# Patient Record
Sex: FEMALE | Race: WHITE | NOT HISPANIC OR LATINO | Employment: FULL TIME | URBAN - METROPOLITAN AREA
[De-identification: names, ages, dates, MRNs, and addresses within clinical notes are randomized per-mention and may not be internally consistent; named-entity substitution may affect disease eponyms.]

---

## 2017-06-01 ENCOUNTER — TRANSFERRED RECORDS (OUTPATIENT)
Dept: HEALTH INFORMATION MANAGEMENT | Facility: CLINIC | Age: 27
End: 2017-06-01

## 2017-06-01 LAB — PAP SMEAR - HIM PATIENT REPORTED: NEGATIVE

## 2019-01-14 ENCOUNTER — OFFICE VISIT (OUTPATIENT)
Dept: INTERNAL MEDICINE | Facility: CLINIC | Age: 29
End: 2019-01-14
Payer: COMMERCIAL

## 2019-01-14 VITALS
OXYGEN SATURATION: 98 % | HEART RATE: 91 BPM | SYSTOLIC BLOOD PRESSURE: 112 MMHG | HEIGHT: 63 IN | RESPIRATION RATE: 18 BRPM | WEIGHT: 200.8 LBS | BODY MASS INDEX: 35.58 KG/M2 | DIASTOLIC BLOOD PRESSURE: 72 MMHG | TEMPERATURE: 98.4 F

## 2019-01-14 DIAGNOSIS — Z83.49 FAMILY HISTORY OF THYROID DISEASE: ICD-10-CM

## 2019-01-14 DIAGNOSIS — R30.0 DYSURIA: Primary | ICD-10-CM

## 2019-01-14 DIAGNOSIS — F33.41 RECURRENT MAJOR DEPRESSIVE DISORDER, IN PARTIAL REMISSION (H): ICD-10-CM

## 2019-01-14 DIAGNOSIS — Z71.6 ENCOUNTER FOR SMOKING CESSATION COUNSELING: ICD-10-CM

## 2019-01-14 LAB
ALBUMIN UR-MCNC: NEGATIVE MG/DL
APPEARANCE UR: CLEAR
BILIRUB UR QL STRIP: NEGATIVE
COLOR UR AUTO: YELLOW
GLUCOSE UR STRIP-MCNC: NEGATIVE MG/DL
HGB UR QL STRIP: NEGATIVE
KETONES UR STRIP-MCNC: NEGATIVE MG/DL
LEUKOCYTE ESTERASE UR QL STRIP: NEGATIVE
NITRATE UR QL: NEGATIVE
PH UR STRIP: 7.5 PH (ref 5–7)
SOURCE: ABNORMAL
SP GR UR STRIP: 1.01 (ref 1–1.03)
UROBILINOGEN UR STRIP-ACNC: 0.2 EU/DL (ref 0.2–1)

## 2019-01-14 PROCEDURE — 81003 URINALYSIS AUTO W/O SCOPE: CPT | Performed by: INTERNAL MEDICINE

## 2019-01-14 PROCEDURE — 99214 OFFICE O/P EST MOD 30 MIN: CPT | Performed by: INTERNAL MEDICINE

## 2019-01-14 PROCEDURE — 84443 ASSAY THYROID STIM HORMONE: CPT | Performed by: INTERNAL MEDICINE

## 2019-01-14 PROCEDURE — 36415 COLL VENOUS BLD VENIPUNCTURE: CPT | Performed by: INTERNAL MEDICINE

## 2019-01-14 RX ORDER — CITALOPRAM HYDROBROMIDE 20 MG/1
40 TABLET ORAL DAILY
Start: 2019-01-14

## 2019-01-14 RX ORDER — CITALOPRAM HYDROBROMIDE 20 MG/1
TABLET ORAL
COMMUNITY
Start: 2015-04-01 | End: 2019-01-14

## 2019-01-14 ASSESSMENT — MIFFLIN-ST. JEOR: SCORE: 1618.91

## 2019-01-14 NOTE — NURSING NOTE
"Vital signs:  Temp: 98.4  F (36.9  C) Temp src: Oral BP: 112/72 Pulse: 91   Resp: 18 SpO2: 98 %     Height: 160.7 cm (5' 3.25\") Weight: 91.1 kg (200 lb 12.8 oz)  Estimated body mass index is 35.29 kg/m  as calculated from the following:    Height as of this encounter: 1.607 m (5' 3.25\").    Weight as of this encounter: 91.1 kg (200 lb 12.8 oz).          "

## 2019-01-14 NOTE — PROGRESS NOTES
ASSESSMENT/PLAN:       1. Dysuria  UA without signs of infection, encourage more hydration.   - UA reflex to Microscopic and Culture    2. Family history of thyroid disease  Thyroid levels checked, normal  - TSH with free T4 reflex    3. Recurrent major depressive disorder, in partial remission (H)  Refilled today as is working well for patient  - citalopram (CELEXA) 20 MG tablet; Take 2 tablets (40 mg) by mouth daily    4. Encounter for smoking cessation counseling  Did encourage patient to start nicotine patch, instructed her on use  - nicotine (NICODERM CQ) 7 MG/24HR 24 hr patch; Place 1 patch onto the skin every 24 hours  Dispense: 60 patch; Refill: 3    Miri Carrillo MD  [facility]        SUBJECTIVE:   Danisha Blood is a 28 year old female who presents to clinic today for the following health issues:    She would like to have her thyroid level check today. She also has urine odor for the past couple of months on and off.    Dysuria off/on, felt similar to her previous UTI. No fevers.    She recently found her father has Hashimoto's thyroiditis. Feeling hot/cold fluctuations. She occasionally has softer stools/diarrhea. No weight gain recently.     TSH at 1.18 last 2/2017    Current smoker:  1 pack/week. Feeling as if they want to quit.      Depression/Anxiety  Seeing a therapist for over 10 years  She is also seeing pyschiatrist, on celexa 40mg. Mood symptoms well controlled    Problem list and histories reviewed & adjusted, as indicated.  Additional history: as documented    There is no problem list on file for this patient.    History reviewed. No pertinent surgical history.    Social History     Tobacco Use     Smoking status: Current Every Day Smoker     Smokeless tobacco: Never Used   Substance Use Topics     Alcohol use: Yes     Comment: Once a day     Family History   Problem Relation Age of Onset     Diabetes Father            Reviewed and updated as needed this visit by clinical  "staff  Tobacco  Allergies  Meds  Med Hx  Surg Hx  Fam Hx  Soc Hx      Reviewed and updated as needed this visit by Provider         ROS:  CONSTITUTIONAL: NEGATIVE for fever, chills, change in weight  INTEGUMENTARY/SKIN: NEGATIVE for worrisome rashes, moles or lesions  EYES: NEGATIVE for vision changes or irritation  ENT/MOUTH: NEGATIVE for ear, mouth and throat problems  RESP: NEGATIVE for significant cough or SOB  BREAST: NEGATIVE for masses, tenderness or discharge  CV: NEGATIVE for chest pain, palpitations or peripheral edema  GI: +heartburn, diarrhea  : NEGATIVE for frequency, dysuria, or hematuria  MUSCULOSKELETAL: +intermittent left-sided neck pain  NEURO: NEGATIVE for weakness, dizziness or paresthesias  ENDOCRINE: NEGATIVE for temperature intolerance, skin/hair changes  HEME: NEGATIVE for bleeding problems  PSYCHIATRIC: NEGATIVE for changes in mood or affect    OBJECTIVE:     /72 (BP Location: Right arm, Patient Position: Sitting, Cuff Size: Adult Regular)   Pulse 91   Temp 98.4  F (36.9  C) (Oral)   Resp 18   Ht 1.607 m (5' 3.25\")   Wt 91.1 kg (200 lb 12.8 oz)   SpO2 98%   BMI 35.29 kg/m    Body mass index is 35.29 kg/m .  GENERAL: healthy, alert and no distress  NECK: no adenopathy, no asymmetry, masses, or scars and thyroid normal to palpation  RESP: lungs clear to auscultation - no rales, rhonchi or wheezes  CV: regular rate and rhythm, normal S1 S2, no S3 or S4, no murmur, click or rub, no peripheral edema and peripheral pulses strong  ABDOMEN: soft, nontender, no hepatosplenomegaly, no masses and bowel sounds normal  MS: no gross musculoskeletal defects noted, no edema    Diagnostic Test Results:  none     "

## 2019-01-15 LAB — TSH SERPL DL<=0.005 MIU/L-ACNC: 4 MU/L (ref 0.4–4)

## 2019-02-15 ENCOUNTER — HEALTH MAINTENANCE LETTER (OUTPATIENT)
Age: 29
End: 2019-02-15

## 2019-07-02 ENCOUNTER — HOSPITAL ENCOUNTER (EMERGENCY)
Facility: CLINIC | Age: 29
Discharge: HOME | End: 2019-07-02
Attending: EMERGENCY MEDICINE | Admitting: EMERGENCY MEDICINE
Payer: COMMERCIAL

## 2019-07-02 VITALS
RESPIRATION RATE: 16 BRPM | OXYGEN SATURATION: 97 % | DIASTOLIC BLOOD PRESSURE: 81 MMHG | SYSTOLIC BLOOD PRESSURE: 130 MMHG | TEMPERATURE: 99 F | HEART RATE: 97 BPM
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
## 2019-07-02 PROCEDURE — 12015 RPR F/E/E/N/L/M 7.6-12.5 CM: CPT

## 2019-07-02 PROCEDURE — 99283 EMERGENCY DEPT VISIT LOW MDM: CPT | Mod: 25

## 2019-07-02 PROCEDURE — 90471 IMMUNIZATION ADMIN: CPT

## 2019-07-02 PROCEDURE — 90715 TDAP VACCINE 7 YRS/> IM: CPT | Performed by: EMERGENCY MEDICINE

## 2019-07-02 PROCEDURE — 25000128 H RX IP 250 OP 636: Performed by: EMERGENCY MEDICINE

## 2019-07-02 RX ADMIN — CLOSTRIDIUM TETANI TOXOID ANTIGEN (FORMALDEHYDE INACTIVATED), CORYNEBACTERIUM DIPHTHERIAE TOXOID ANTIGEN (FORMALDEHYDE INACTIVATED), BORDETELLA PERTUSSIS TOXOID ANTIGEN (GLUTARALDEHYDE INACTIVATED), BORDETELLA PERTUSSIS FILAMENTOUS HEMAGGLUTININ ANTIGEN (FORMALDEHYDE INACTIVATED), BORDETELLA PERTUSSIS PERTACTIN ANTIGEN, AND BORDETELLA PERTUSSIS FIMBRIAE 2/3 ANTIGEN 0.5 ML: 5; 2; 2.5; 5; 3; 5 INJECTION, SUSPENSION INTRAMUSCULAR at 20:32

## 2019-07-02 ASSESSMENT — ENCOUNTER SYMPTOMS
EYE PAIN: 0
WOUND: 1

## 2019-07-02 NOTE — ED AVS SNAPSHOT
Emergency Department  [address] [place] [address]  Phone:  [phone]  Fax:  [phone]                                    Danisha Blood   MRN: [id number]    Department:   Emergency Department   Date of Visit:  7/2/2019           After Visit Summary Signature Page    I have received my discharge instructions, and my questions have been answered. I have discussed any challenges I see with this plan with the nurse or doctor.    ..........................................................................................................................................  Patient/Patient Representative Signature      ..........................................................................................................................................  Patient Representative Print Name and Relationship to Patient    ..................................................               ................................................  Date                                   Time    ..........................................................................................................................................  Reviewed by Signature/Title    ...................................................              ..............................................  Date                                               Time          22EPIC Rev 08/18

## 2019-07-03 NOTE — ED PROVIDER NOTES
History     Chief Complaint:  Dog Bite     HPI   Danisha Blood is a 28 year old female who presents for evaluation of a dog bite. Today around 1840, the patient suffered an unprovoked bite from her mother's dog sustaining lacerations above and below her right eye and near her right ear. The dog's shots are up to date and the dog has been acting normally. Due to these injuries, the patient came into the ED for evaluation. She has not had any eye pain or visual disturbance.     Allergies:  NKDA      Medications:    citalopram (CELEXA) 20 MG tablet  levonorgestrel (KYLEENA) 19.5 MG IUD  nicotine (NICODERM CQ) 7 MG/24HR 24 hr patch    Past Medical History:    Depression     Past Surgical History:    History reviewed. No pertinent past surgical history.     Family History:    Diabetes - Father     Social History:  Tobacco use:    Current every day smoker   Alcohol use:    Positive   Marital status:    Single   Accompanied to ED by:  Family      Review of Systems   Eyes: Negative for pain and visual disturbance.   Skin: Positive for wound (dog bite, above and below right eye, near right ear).   All other systems reviewed and are negative.      Physical Exam   First Vitals:  BP: (!) 140/96  Pulse: 97  Heart Rate: 90  Temp: 99  F (37.2  C)  Resp: 16  SpO2: 97 %    Physical Exam  Vitals: reviewed by me  General: Pt seen on \[facility]", [facility], cooperative, and alert to conversation  Eyes: Tracking well, clear conjunctiva BL, does have a 3 cm crescent-shaped laceration above her eyelid, slightly inferior to the eyebrow, superficial, violates to dermis only.  Also has a 5 cm crescent-shaped laceration underneath her inferior eyelid, does not violate eyelid or nasolacrimal apparatus.  This wound is deep to subcutaneous fat.  ENT: MMM, midline trachea.   A third laceration is present anterior to the right ear, superficial to subcutaneous fat only, and thorough exploration under anesthesia I see no evidence of  parotid gland involvement.  Lungs:   No tachypnea, no accessory muscle use. No respiratory distress.   CV: Rate as above, regular rhythm.    MSK: no peripheral edema or joint effusion.  No evidence of trauma  Skin: No rash, normal turgor and temperature  Neuro: Clear speech and no facial droop.  All branches of facial nerve are intact on the right side.  Psych: Not RIS, no e/o AH/VH        Emergency Department Course     Procedures:     Laceration Repair        LACERATION:  A simple minimally Contaminated 8 cm laceration-broken up in 3 separate places as above.      LOCATION: Preauricular area, supraorbital, infraorbital.      FUNCTION:  Distally sensation, circulation and motor are intact.      ANESTHESIA:  Local using 8ccs total of lido w epi       PREPARATION:  Irrigation and Scrubbing with Shur Clens      DEBRIDEMENT:  no debridement      CLOSURE:  Wound was closed with One Layer.  Skin closed with 8 x 5.0 Ethylon using interrupted sutures in a very loose fashion, allowing room for drainage.    Interventions:  2032 Tdap 0.5 mL IM     Emergency Department Course:  Nursing notes and vitals reviewed.  1928: I performed an exam of the patient as documented above.     2011:  A laceration repair was performed as outlined in the procedure note above.  The patient tolerated well and there were no complications.     Findings and plan explained to the Patient. Patient discharged home with instructions regarding supportive care, medications, and reasons to return. The importance of close follow-up was reviewed. The patient was prescribed Augmentin.      Impression & Plan      Medical Decision Making:  Danisha Blood is a 28 year old female who presents to the emergency room with a dog bite. This was a non-provoked bite but the dog is up to date on its shots. The patient states that she feels comfortable going home, can avoid the dog very easily. Mother is owner of the dog. The patient has wounds to her supra and  infraorbital space as well as just anterior to her tragus but has thankfully no neurologic issues. Her facial nerve appears to be completely intact. I see no evidence of parotid gland injury, and lids and lashes as well as her nasolacrimal apparatus appear to be completely uninvolved. She is not entrapped and has a normal exam under anesthesia, and these appear to be relatively superficial cuts although one did break through to the subcutaneous fat. The patient was copiously irrigated as above and will be given Augmentin to go home with. She knows to come back to the ER with any fevers or redness and I note that given the cosmetic nature of the wound I did elect to do a loose closure instead of leaving these open to heal, which was discussed with the patient. Will discharge with very clear return to ED precautions and primary care follow up.     Diagnosis:    ICD-10-CM   1. Dog bite, initial encounter W54.0XXA       Disposition:  Discharged to home with Augmentin.     Discharge Medications:  amoxicillin-clavulanate 875-125 MG tablet  Commonly known as:  AUGMENTIN  1 tablet, Oral, 2 TIMES DAILY            ILinden, am serving as a scribe at 7:28 PM on 7/2/2019 to document services personally performed by Dr. Luna, based on my observations and the provider's statements to me.     EMERGENCY DEPARTMENT       Laci Luna MD  07/02/19 [id number]

## 2019-11-21 ENCOUNTER — MYC REFILL (OUTPATIENT)
Dept: INTERNAL MEDICINE | Facility: CLINIC | Age: 29
End: 2019-11-21

## 2019-11-21 DIAGNOSIS — F33.41 RECURRENT MAJOR DEPRESSIVE DISORDER, IN PARTIAL REMISSION (H): ICD-10-CM

## 2019-11-21 NOTE — TELEPHONE ENCOUNTER
"Requested Prescriptions   Pending Prescriptions Disp Refills     citalopram (CELEXA) 20 MG tablet       Sig: Take 2 tablets (40 mg) by mouth daily       SSRIs Protocol Failed - 11/21/2019  1:05 AM        Failed - PHQ-9 score less than 5 in past 6 months     Please review last PHQ-9 score.           Passed - Medication is active on med list        Passed - Patient is age 18 or older        Passed - No active pregnancy on record        Passed - No positive pregnancy test in last 12 months        Passed - Recent (6 mo) or future (30 days) visit within the authorizing provider's specialty     Patient had office visit in the last 6 months or has a visit in the next 30 days with authorizing provider or within the authorizing provider's specialty.  See \"Patient Info\" tab in inbasket, or \"Choose Columns\" in Meds & Orders section of the refill encounter.              "

## 2019-11-21 NOTE — TELEPHONE ENCOUNTER
"Per 1/14/19 office visit note:  \"Depression/Anxiety  Seeing a therapist for over 10 years  She is also seeing pyschiatrist, on celexa 40mg. Mood symptoms well controlled\"    Medication reported historical. Pharmacy states this has been prescribed by a provider outside of this clinic. [facility] message sent asking patient if this is still prescribed by another provider.    "

## 2019-12-09 ENCOUNTER — MYC MEDICAL ADVICE (OUTPATIENT)
Dept: INTERNAL MEDICINE | Facility: CLINIC | Age: 29
End: 2019-12-09

## 2019-12-09 ENCOUNTER — OFFICE VISIT (OUTPATIENT)
Dept: INTERNAL MEDICINE | Facility: CLINIC | Age: 29
End: 2019-12-09
Payer: COMMERCIAL

## 2019-12-09 VITALS
HEART RATE: 69 BPM | TEMPERATURE: 98.4 F | SYSTOLIC BLOOD PRESSURE: 110 MMHG | RESPIRATION RATE: 16 BRPM | WEIGHT: 199 LBS | BODY MASS INDEX: 35.26 KG/M2 | OXYGEN SATURATION: 99 % | DIASTOLIC BLOOD PRESSURE: 68 MMHG | HEIGHT: 63 IN

## 2019-12-09 DIAGNOSIS — Z02.9 ADMINISTRATIVE ENCOUNTER: ICD-10-CM

## 2019-12-09 DIAGNOSIS — Z00.00 ROUTINE GENERAL MEDICAL EXAMINATION AT A HEALTH CARE FACILITY: Primary | ICD-10-CM

## 2019-12-09 DIAGNOSIS — R53.83 OTHER FATIGUE: ICD-10-CM

## 2019-12-09 DIAGNOSIS — F33.41 RECURRENT MAJOR DEPRESSIVE DISORDER, IN PARTIAL REMISSION (H): ICD-10-CM

## 2019-12-09 DIAGNOSIS — R68.89 COLD INTOLERANCE: ICD-10-CM

## 2019-12-09 PROCEDURE — 99395 PREV VISIT EST AGE 18-39: CPT | Performed by: INTERNAL MEDICINE

## 2019-12-09 RX ORDER — CITALOPRAM HYDROBROMIDE 20 MG/1
40 TABLET ORAL DAILY
OUTPATIENT
Start: 2019-12-09

## 2019-12-09 ASSESSMENT — ENCOUNTER SYMPTOMS
NAUSEA: 0
HEARTBURN: 0
FREQUENCY: 0
HEMATURIA: 0
COUGH: 0
ABDOMINAL PAIN: 0
BREAST MASS: 0
PALPITATIONS: 0
EYE PAIN: 0
DIARRHEA: 0
HEADACHES: 1
HEMATOCHEZIA: 0
FEVER: 0
DIZZINESS: 0
SHORTNESS OF BREATH: 0
DYSURIA: 0
PARESTHESIAS: 0
JOINT SWELLING: 0
ARTHRALGIAS: 0
CONSTIPATION: 0
NERVOUS/ANXIOUS: 0
SORE THROAT: 0
MYALGIAS: 0
CHILLS: 0
WEAKNESS: 0

## 2019-12-09 ASSESSMENT — PATIENT HEALTH QUESTIONNAIRE - PHQ9: SUM OF ALL RESPONSES TO PHQ QUESTIONS 1-9: 4

## 2019-12-09 ASSESSMENT — MIFFLIN-ST. JEOR: SCORE: 1605.75

## 2019-12-09 NOTE — PATIENT INSTRUCTIONS
Plan:  1. Please make a lab appointment for fasting labs  ( 12 hours fasting)   2. Form for school  3. Call/write back with the date of the last physical and pap  4. If the thyroid results are back abnormal we will consider meds as we discussed

## 2019-12-09 NOTE — PROGRESS NOTES
Dr Seay's note    Patient's instructions / PLAN:                                                        Plan:  1. Please make a lab appointment for fasting labs  ( 12 hours fasting)   2. Form for school  3. Call/write back with the date of the last physical and pap  4. If the thyroid results are back abnormal we will consider meds as we discussed     ASSESSMENT & PLAN:                                                        (Z00.00) Routine general medical examination at a health care facility  (primary encounter diagnosis)  Comment:   Plan: CBC with platelets, Comprehensive metabolic         panel, Lipid panel reflex to direct LDL         Fasting, TSH with free T4 reflex, Thyroid         peroxidase antibody          (F33.41) Recurrent major depressive disorder, in partial remission (H)  Comment: Controlled    Plan: f/u pychiatrist     (Z02.9) Administrative encounter  Comment:   Plan:     (R68.89) Cold intolerance  Comment: Not controlled   Plan: TSH with free T4 reflex, Thyroid peroxidase         antibody          (R53.83) Other fatigue  Comment: Not controlled, father has Hashimoto's   Plan: CBC with platelets, Comprehensive metabolic         panel, Lipid panel reflex to direct LDL         Fasting, TSH with free T4 reflex, Thyroid         peroxidase antibody    Chief Complaint:                                                      Annual exam  Forms for school     SUBJECTIVE:                                                    History of present illness     Ave Blood is a 29 year old female who presents to the clinic  today for a preventative visit and to establish care    We reviewed the chronic medical problems as above.   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    Preventative  -- Saw OB/GYN last April or the April before at [facility] in [place]  -- She had her last Pap then, but is unsure of the  exact date.  -- Flu vaccine on Oct 7  -- She has not had a blood test recently, and did not do them at OB/GYN  --   Patient reports:  -- pap smear done on June 2017 with OBGYN. It was normal . Info sent to [facility]       Thyroid  -- Her father has Hashimoto's, and she says she has been having some symptoms  -- She says her body runs warm her whole life, but recently she will feel cold one minute, and then very hot then next  -- She has been sleeping a lot more and feeling very tired  -- She did a sleep study a couple of years ago, she was told that she did not have sleep apnea and she did pursue further tests due to the cost.  -- TSH in January was 4.0, borderline high  -- Interested in medication if her next TSH levels are high    Depression/Anxitey  -- She sees psychiatrist   -- on Citalopram     ROS:   See Below    This document serves as a record of the services and decisions personally performed and made by Dr. Dawood MD. It was created on their behalf by Hasmukh Mccullough, a trained medical scribe. The creation of this document is based on the provider's statements to the medical scribe.  Hasmukh Mccullough December 9, 2019 9:47 AM    Past Medical History:   Diagnosis Date     Depression       History reviewed. No pertinent surgical history.     Soc Hx: No daily alcohol, no smoking  Social History     Socioeconomic History     Marital status: Single     Spouse name: Not on file     Number of children: Not on file     Years of education: Not on file     Highest education level: Not on file   Occupational History     Not on file   Social Needs     Financial resource strain: Not on file     Food insecurity:     Worry: Not on file     Inability: Not on file     Transportation needs:     Medical: Not on file     Non-medical: Not on file   Tobacco Use     Smoking status: Current Every Day Smoker     Smokeless tobacco: Never Used   Substance and Sexual Activity     Alcohol use: Yes     Comment: Once a day     Drug use: No  "    Sexual activity: Yes     Partners: Male   Lifestyle     Physical activity:     Days per week: Not on file     Minutes per session: Not on file     Stress: Not on file   Relationships     Social connections:     Talks on phone: Not on file     Gets together: Not on file     Attends Gnosticism service: Not on file     Active member of club or organization: Not on file     Attends meetings of clubs or organizations: Not on file     Relationship status: Not on file     Intimate partner violence:     Fear of current or ex partner: Not on file     Emotionally abused: Not on file     Physically abused: Not on file     Forced sexual activity: Not on file   Other Topics Concern     Not on file   Social History Narrative     Not on file        Fam Hx: reviewed  Family History   Problem Relation Age of Onset     Diabetes Father          Screening: reviewed      All: reviewed    Meds: reviewed  Current Outpatient Medications   Medication Sig Dispense Refill     citalopram (CELEXA) 20 MG tablet Take 2 tablets (40 mg) by mouth daily       levonorgestrel (KYLEENA) 19.5 MG IUD 1 each by Intrauterine route once         OBJECTIVE:                                                    Physical Exam :  Blood pressure 110/68, pulse 69, temperature 98.4  F (36.9  C), temperature source Oral, resp. rate 16, height 1.607 m (5' 3.25\"), weight 90.3 kg (199 lb), SpO2 99 %.   NAD, appears comfortable  Skin clear, no rashes  HEENT: PERRLA, EOMI, anicteric sclera, pink conjunctiva, external ears appear normal, bilateral tympanic membranes clinically normal, oropharynx normal color.  Neck: supple, no JVD,  no thyroidmegaly  Lymph nodes non palpable in the cervical, supraclavicular axillaries, inguinal areas  Chest: clear to auscultation with good respiratory effort  Cardiac: S1S2, RRR, no mgr appreciated  Abdomen: soft, not tender, not distended, audible bowel sound, no hepatosplenomegaly, no palpable masses, no abdominal bruits  Extremities: no " cyanosis, clubbing or edema.   Neuro: A, Ox3, no focal signs.  Breast exam in supine and erect position: they are symmetrical, no skin changes, no tenderness or nodes on palpation. Nipples are erect, no skin lesions, no discharge on pressure.    Pelvic exam: deferred, no symptoms, no hx of abnormal pap     The information in this document, created by the medical scribe for me, accurately reflects the services I personally performed and the decisions made by me. I have reviewed and approved this document for accuracy prior to leaving the patient care area.  December 9, 2019 10:23 AM     Awilda Seay MD  Internal Medicine     SUBJECTIVE:   CC: Danisha Blood is an 29 year old woman who presents for preventive health visit.     Healthy Habits:     Getting at least 3 servings of Calcium per day:  NO    Bi-annual eye exam:  Yes    Dental care twice a year:  Yes    Sleep apnea or symptoms of sleep apnea:  Daytime drowsiness    Diet:  Carbohydrate counting    Frequency of exercise:  None    Taking medications regularly:  Yes    Medication side effects:  None    PHQ-2 Total Score: 0    Additional concerns today:  Yes    Pap: last pap was normal about 1.5 years ago.      Today's PHQ-2 Score:   PHQ-2 ( 1999 Pfizer) 12/9/2019   Q1: Little interest or pleasure in doing things 0   Q2: Feeling down, depressed or hopeless 0   PHQ-2 Score 0   Q1: Little interest or pleasure in doing things Not at all   Q2: Feeling down, depressed or hopeless Not at all   PHQ-2 Score 0       Abuse: Current or Past(Physical, Sexual or Emotional)- No  Do you feel safe in your environment? Yes        Social History     Tobacco Use     Smoking status: Current Every Day Smoker     Smokeless tobacco: Never Used   Substance Use Topics     Alcohol use: Yes     Comment: Once a day     If you drink alcohol do you typically have >3 drinks per day or >7 drinks per week? Yes  About 7 drinks a week    Alcohol Use 12/9/2019   Prescreen: >3 drinks/day or >7  drinks/week? Yes   AUDIT SCORE  7     AUDIT - Alcohol Use Disorders Identification Test - Reproduced from the World Health Organization Audit 2001 (Second Edition) 12/9/2019   1.  How often do you have a drink containing alcohol? 2 to 3 times a week   2.  How many drinks containing alcohol do you have on a typical day when you are drinking? 3 or 4   3.  How often do you have five or more drinks on one occasion? Monthly   4.  How often during the last year have you found that you were not able to stop drinking once you had started? Never   5.  How often during the last year have you failed to do what was normally expected of you because of drinking? Less than monthly   6.  How often during the last year have you needed a first drink in the morning to get yourself going after a heavy drinking session? Never   7.  How often during the last year have you had a feeling of guilt or remorse after drinking? Never   8.  How often during the last year have you been unable to remember what happened the night before because of your drinking? Never   9.  Have you or someone else been injured because of your drinking? No   10. Has a relative, friend, doctor or other health care worker been concerned about your drinking or suggested you cut down? No   TOTAL SCORE 7       Reviewed orders with patient.  Reviewed health maintenance and updated orders accordingly - Yes  Labs reviewed in EPIC    Pertinent mammograms are reviewed under the imaging tab.  History of abnormal Pap smear: NO - age 21-29 PAP every 3 years recommended     Reviewed and updated as needed this visit by clinical staff         Reviewed and updated as needed this visit by Provider        Review of Systems   Constitutional: Negative for chills and fever.   HENT: Negative for congestion, ear pain, hearing loss and sore throat.    Eyes: Negative for pain and visual disturbance.   Respiratory: Negative for cough and shortness of breath.    Cardiovascular: Negative for  "chest pain, palpitations and peripheral edema.   Gastrointestinal: Negative for abdominal pain, constipation, diarrhea, heartburn, hematochezia and nausea.   Breasts:  Negative for tenderness, breast mass and discharge.   Genitourinary: Negative for dysuria, frequency, genital sores, hematuria, pelvic pain, urgency, vaginal bleeding and vaginal discharge.   Musculoskeletal: Negative for arthralgias, joint swelling and myalgias.   Skin: Negative for rash.   Neurological: Positive for headaches. Negative for dizziness, weakness and paresthesias.   Psychiatric/Behavioral: Negative for mood changes. The patient is not nervous/anxious.        COUNSELING:  Reviewed preventive health counseling, as reflected in patient instructions       Regular exercise       Healthy diet/nutrition    Estimated body mass index is 35.29 kg/m  as calculated from the following:    Height as of 1/14/19: 1.607 m (5' 3.25\").    Weight as of 1/14/19: 91.1 kg (200 lb 12.8 oz).    Weight management plan: Discussed healthy diet and exercise guidelines     reports that she has been smoking. She has never used smokeless tobacco.      Counseling Resources:  ATP IV Guidelines  Pooled Cohorts Equation Calculator  Breast Cancer Risk Calculator  FRAX Risk Assessment  ICSI Preventive Guidelines  Dietary Guidelines for Americans, 2010  USDA's MyPlate  ASA Prophylaxis  Lung CA Screening    Awilda Higgins MD  [facility]  "

## 2019-12-09 NOTE — Clinical Note
Patient reports:-- pap smear done on June 2017 with OBGYN. It was normal . Info sent to [facility]

## 2019-12-13 DIAGNOSIS — R68.89 COLD INTOLERANCE: ICD-10-CM

## 2019-12-13 DIAGNOSIS — R53.83 OTHER FATIGUE: ICD-10-CM

## 2019-12-13 DIAGNOSIS — Z00.00 ROUTINE GENERAL MEDICAL EXAMINATION AT A HEALTH CARE FACILITY: ICD-10-CM

## 2019-12-13 LAB
ALBUMIN SERPL-MCNC: 4.2 G/DL (ref 3.4–5)
ALP SERPL-CCNC: 72 U/L (ref 40–150)
ALT SERPL W P-5'-P-CCNC: 55 U/L (ref 0–50)
ANION GAP SERPL CALCULATED.3IONS-SCNC: 9 MMOL/L (ref 3–14)
AST SERPL W P-5'-P-CCNC: 24 U/L (ref 0–45)
BILIRUB SERPL-MCNC: 2.4 MG/DL (ref 0.2–1.3)
BUN SERPL-MCNC: 11 MG/DL (ref 7–30)
CALCIUM SERPL-MCNC: 9.3 MG/DL (ref 8.5–10.1)
CHLORIDE SERPL-SCNC: 108 MMOL/L (ref 94–109)
CHOLEST SERPL-MCNC: 127 MG/DL
CO2 SERPL-SCNC: 23 MMOL/L (ref 20–32)
CREAT SERPL-MCNC: 0.69 MG/DL (ref 0.52–1.04)
ERYTHROCYTE [DISTWIDTH] IN BLOOD BY AUTOMATED COUNT: 13.3 % (ref 10–15)
GFR SERPL CREATININE-BSD FRML MDRD: >90 ML/MIN/{1.73_M2}
GLUCOSE SERPL-MCNC: 80 MG/DL (ref 70–99)
HCT VFR BLD AUTO: 35.4 % (ref 35–47)
HDLC SERPL-MCNC: 37 MG/DL
HGB BLD-MCNC: 12.2 G/DL (ref 11.7–15.7)
LDLC SERPL CALC-MCNC: 66 MG/DL
MCH RBC QN AUTO: 31 PG (ref 26.5–33)
MCHC RBC AUTO-ENTMCNC: 34.5 G/DL (ref 31.5–36.5)
MCV RBC AUTO: 90 FL (ref 78–100)
NONHDLC SERPL-MCNC: 90 MG/DL
PLATELET # BLD AUTO: 258 10E9/L (ref 150–450)
POTASSIUM SERPL-SCNC: 3.4 MMOL/L (ref 3.4–5.3)
PROT SERPL-MCNC: 7.3 G/DL (ref 6.8–8.8)
RBC # BLD AUTO: 3.93 10E12/L (ref 3.8–5.2)
SODIUM SERPL-SCNC: 140 MMOL/L (ref 133–144)
TRIGL SERPL-MCNC: 122 MG/DL
TSH SERPL DL<=0.005 MIU/L-ACNC: 1.76 MU/L (ref 0.4–4)
WBC # BLD AUTO: 8.5 10E9/L (ref 4–11)

## 2019-12-13 PROCEDURE — 80061 LIPID PANEL: CPT | Performed by: INTERNAL MEDICINE

## 2019-12-13 PROCEDURE — 36415 COLL VENOUS BLD VENIPUNCTURE: CPT | Performed by: INTERNAL MEDICINE

## 2019-12-13 PROCEDURE — 86376 MICROSOMAL ANTIBODY EACH: CPT | Performed by: INTERNAL MEDICINE

## 2019-12-13 PROCEDURE — 84443 ASSAY THYROID STIM HORMONE: CPT | Performed by: INTERNAL MEDICINE

## 2019-12-13 PROCEDURE — 80053 COMPREHEN METABOLIC PANEL: CPT | Performed by: INTERNAL MEDICINE

## 2019-12-13 PROCEDURE — 85027 COMPLETE CBC AUTOMATED: CPT | Performed by: INTERNAL MEDICINE

## 2019-12-16 ENCOUNTER — MYC MEDICAL ADVICE (OUTPATIENT)
Dept: INTERNAL MEDICINE | Facility: CLINIC | Age: 29
End: 2019-12-16

## 2019-12-16 DIAGNOSIS — R79.89 LFT ELEVATION: Primary | ICD-10-CM

## 2019-12-16 LAB — THYROPEROXIDASE AB SERPL-ACNC: <10 IU/ML

## 2020-03-20 ENCOUNTER — MYC MEDICAL ADVICE (OUTPATIENT)
Dept: INTERNAL MEDICINE | Facility: CLINIC | Age: 30
End: 2020-03-20

## 2020-03-23 NOTE — TELEPHONE ENCOUNTER
Per [employer] [facility] message encounter 12/16/19:  The recent blood test results are in acceptable range, but mildly elevated liver numbers.  I would like you to do nonfasting blood test in about 3 months to recheck the liver.  You will need a lab appointment.  Meanwhile try to avoid any alcohol, any over-the-counter supplements/herbs    Informed patient to call to schedule lab only appointment for non-fasting lab.

## 2020-04-23 DIAGNOSIS — R79.89 LFT ELEVATION: ICD-10-CM

## 2020-04-23 PROCEDURE — 80076 HEPATIC FUNCTION PANEL: CPT | Performed by: INTERNAL MEDICINE

## 2020-04-23 PROCEDURE — 36415 COLL VENOUS BLD VENIPUNCTURE: CPT | Performed by: INTERNAL MEDICINE

## 2020-04-24 LAB
ALBUMIN SERPL-MCNC: 3.9 G/DL (ref 3.4–5)
ALP SERPL-CCNC: 69 U/L (ref 40–150)
ALT SERPL W P-5'-P-CCNC: 48 U/L (ref 0–50)
AST SERPL W P-5'-P-CCNC: 18 U/L (ref 0–45)
BILIRUB DIRECT SERPL-MCNC: 0.2 MG/DL (ref 0–0.2)
BILIRUB SERPL-MCNC: 1.4 MG/DL (ref 0.2–1.3)
PROT SERPL-MCNC: 7.4 G/DL (ref 6.8–8.8)

## 2021-01-03 ENCOUNTER — HEALTH MAINTENANCE LETTER (OUTPATIENT)
Age: 31
End: 2021-01-03

## 2021-01-15 ENCOUNTER — HEALTH MAINTENANCE LETTER (OUTPATIENT)
Age: 31
End: 2021-01-15

## 2021-02-03 ENCOUNTER — VIRTUAL VISIT (OUTPATIENT)
Dept: FAMILY MEDICINE | Facility: CLINIC | Age: 31
End: 2021-02-03
Payer: COMMERCIAL

## 2021-02-03 VITALS — WEIGHT: 211 LBS | HEIGHT: 64 IN | BODY MASS INDEX: 36.02 KG/M2

## 2021-02-03 DIAGNOSIS — Z11.4 SCREENING FOR HIV (HUMAN IMMUNODEFICIENCY VIRUS): ICD-10-CM

## 2021-02-03 DIAGNOSIS — Z13.29 SCREENING FOR THYROID DISORDER: ICD-10-CM

## 2021-02-03 DIAGNOSIS — R53.83 FATIGUE, UNSPECIFIED TYPE: Primary | ICD-10-CM

## 2021-02-03 DIAGNOSIS — R74.8 ELEVATED LIVER ENZYMES: ICD-10-CM

## 2021-02-03 DIAGNOSIS — Z13.0 SCREENING FOR BLOOD DISEASE: ICD-10-CM

## 2021-02-03 DIAGNOSIS — Z11.59 NEED FOR HEPATITIS C SCREENING TEST: ICD-10-CM

## 2021-02-03 DIAGNOSIS — Z12.4 SCREENING FOR MALIGNANT NEOPLASM OF CERVIX: ICD-10-CM

## 2021-02-03 DIAGNOSIS — Z13.1 SCREENING FOR DIABETES MELLITUS: ICD-10-CM

## 2021-02-03 PROCEDURE — 99214 OFFICE O/P EST MOD 30 MIN: CPT | Mod: 95 | Performed by: PHYSICIAN ASSISTANT

## 2021-02-03 ASSESSMENT — MIFFLIN-ST. JEOR: SCORE: 1671.06

## 2021-02-03 ASSESSMENT — ANXIETY QUESTIONNAIRES
3. WORRYING TOO MUCH ABOUT DIFFERENT THINGS: SEVERAL DAYS
2. NOT BEING ABLE TO STOP OR CONTROL WORRYING: SEVERAL DAYS
1. FEELING NERVOUS, ANXIOUS, OR ON EDGE: NOT AT ALL
GAD7 TOTAL SCORE: 3
IF YOU CHECKED OFF ANY PROBLEMS ON THIS QUESTIONNAIRE, HOW DIFFICULT HAVE THESE PROBLEMS MADE IT FOR YOU TO DO YOUR WORK, TAKE CARE OF THINGS AT HOME, OR GET ALONG WITH OTHER PEOPLE: SOMEWHAT DIFFICULT
7. FEELING AFRAID AS IF SOMETHING AWFUL MIGHT HAPPEN: NOT AT ALL
6. BECOMING EASILY ANNOYED OR IRRITABLE: SEVERAL DAYS
5. BEING SO RESTLESS THAT IT IS HARD TO SIT STILL: NOT AT ALL

## 2021-02-03 ASSESSMENT — PATIENT HEALTH QUESTIONNAIRE - PHQ9
5. POOR APPETITE OR OVEREATING: NOT AT ALL
SUM OF ALL RESPONSES TO PHQ QUESTIONS 1-9: 2

## 2021-02-03 NOTE — PROGRESS NOTES
"Ave is a 30 year old who is being evaluated via a billable video visit.      How would you like to obtain your AVS? MyChart  If the video visit is dropped, the invitation should be resent by: Text to cell phone: [phone]  Will anyone else be joining your video visit? No    Video Start Time: 1:20 PM  Assessment & Plan     Fatigue, unspecified type  Has had overwhelming fatigue for the last couple of months.  Advised to take vitamin D3 5000 units through the rest of winter.  Has had Hashimoto's in the past so I will recheck her TSH as well as kidney and liver function and CBC.  Father has type 2 diabetes so we will check an A1c.  Please follow-up if symptoms fail to resolve or worsen    - TSH with free T4 reflex; Future  - Comprehensive metabolic panel; Future  - CBC with platelets; Future  - Hemoglobin A1c; Future    Screening for HIV (human immunodeficiency virus)    - HIV Antigen Antibody Combo; Future    Need for hepatitis C screening test    - Hepatitis C Screen Reflex to HCV RNA Quant and Genotype; Future    Screening for malignant neoplasm of cervix      Screening for blood disease    - CBC with platelets; Future    Screening for thyroid disorder    - TSH with free T4 reflex; Future    Elevated liver enzymes    - Comprehensive metabolic panel; Future    Screening for diabetes mellitus    - Comprehensive metabolic panel; Future  - Hemoglobin A1c; Future      Tobacco Cessation:   reports that she has been smoking. She has never used smokeless tobacco.      BMI:   Estimated body mass index is 35.94 kg/m  as calculated from the following:    Height as of this encounter: 1.632 m (5' 4.25\").    Weight as of this encounter: 95.7 kg (211 lb).         Work on weight loss  Regular exercise    No follow-ups on file.    Ramona Ann Aaseby-Aguilera, PA-C  [facility]    Subjective     Ave is a 30 year old who presents to clinic today for the following health issues     HPI     Dino and her partner " have decided to try to get pregnant within the next year.  She is having some concerns over the overwhelming fatigue that she has been experiencing the last few months.  She feels like she she is getting enough sleep but could lay down to take a nap at any point.  She states that she has had Hashimoto's in the past.  Her TSH was in the normal range when last checked a year ago but would like to have it rechecked.  She also had some concerns about elevated liver enzymes at one point and would like to have that rechecked and anything else that could be causing her extreme fatigue.  She has a Kyleena IUD and her menses are very light. No fever, sore throat, cough , SOB, headache, chest pain, stomach pain, bowel or bladder changes, joint pain.    Concern - question about labs- TSH- Thyroid  Onset:   Description:   Intensity: mild  Progression of Symptoms:  same  Accompanying Signs & Symptoms:   Previous history of similar problem:   Precipitating factors:        Worsened by:   Alleviating factors:        Improved by:   Therapies tried and outcome: None        Review of Systems   Constitutional, HEENT, cardiovascular, pulmonary, gi and gu systems are negative, except as otherwise noted.      Objective           Vitals:  No vitals were obtained today due to virtual visit.    Physical Exam   GENERAL: Healthy, alert and no distress  EYES: Eyes grossly normal to inspection.  No discharge or erythema, or obvious scleral/conjunctival abnormalities.  RESP: No audible wheeze, cough, or visible cyanosis.  No visible retractions or increased work of breathing.    SKIN: Visible skin clear. No significant rash, abnormal pigmentation or lesions.  NEURO: Cranial nerves grossly intact.  Mentation and speech appropriate for age.  PSYCH: Mentation appears normal, affect normal/bright, judgement and insight intact, normal speech and appearance well-groomed.          Video-Visit Details    Type of service:  Video Visit    Video End  Time:1:36 PM    Originating Location (pt. Location): Home    Distant Location (provider location):  [facility]     Platform used for Video Visit: Mesfin

## 2021-02-03 NOTE — NURSING NOTE
Future Appointments   Date Time Provider Department Center   2/3/2021  1:20 PM Aaseby-Aguilera, Ramona Ann, PA-C [facility]     Appointment Notes for this encounter:   I want to get my liver enzymes checked and TSH. I have had borderline results in the past and want to ensure everything is fine. I'm concerned I might have hypothyroid disease. And my elevated liver enzymes in the past concerns me because of family history of liver failure.    Health Maintenance Due   Topic Date Due     ANNUAL REVIEW OF HM ORDERS  1990     DEPRESSION ACTION PLAN  1990     MAMMO SCREENING  1990     Pneumococcal Vaccine: Pediatrics (0 to 5 Years) and At-Risk Patients (6 to 64 Years) (1 of 1 - PPSV23) 09/03/1997     HIV SCREENING  09/03/2006     HEPATITIS C SCREENING  09/03/2009     PAP  06/01/2020     PHQ-9  06/09/2020     INFLUENZA VACCINE (1) 09/01/2020     PREVENTIVE CARE VISIT  12/09/2020     Health Maintenance addressed:  Mammogram and Pap Smear    Pap Smear Pt declined, Mammogram Pt declined and PHQ9 / LIGIA / ACT did do phq and ligia- pended pap and mammo    MyChart Status:  Active and Using

## 2021-02-04 ASSESSMENT — ANXIETY QUESTIONNAIRES: GAD7 TOTAL SCORE: 3

## 2021-03-09 DIAGNOSIS — Z13.0 SCREENING FOR BLOOD DISEASE: ICD-10-CM

## 2021-03-09 DIAGNOSIS — Z11.4 SCREENING FOR HIV (HUMAN IMMUNODEFICIENCY VIRUS): ICD-10-CM

## 2021-03-09 DIAGNOSIS — Z11.59 NEED FOR HEPATITIS C SCREENING TEST: ICD-10-CM

## 2021-03-09 DIAGNOSIS — Z13.29 SCREENING FOR THYROID DISORDER: ICD-10-CM

## 2021-03-09 DIAGNOSIS — R74.8 ELEVATED LIVER ENZYMES: ICD-10-CM

## 2021-03-09 DIAGNOSIS — R53.83 FATIGUE, UNSPECIFIED TYPE: ICD-10-CM

## 2021-03-09 DIAGNOSIS — Z13.1 SCREENING FOR DIABETES MELLITUS: ICD-10-CM

## 2021-03-09 LAB
ERYTHROCYTE [DISTWIDTH] IN BLOOD BY AUTOMATED COUNT: 13.9 % (ref 10–15)
HBA1C MFR BLD: 4.1 % (ref 0–5.6)
HCT VFR BLD AUTO: 34.8 % (ref 35–47)
HGB BLD-MCNC: 11.8 G/DL (ref 11.7–15.7)
MCH RBC QN AUTO: 31.1 PG (ref 26.5–33)
MCHC RBC AUTO-ENTMCNC: 33.9 G/DL (ref 31.5–36.5)
MCV RBC AUTO: 92 FL (ref 78–100)
PLATELET # BLD AUTO: 253 10E9/L (ref 150–450)
RBC # BLD AUTO: 3.79 10E12/L (ref 3.8–5.2)
WBC # BLD AUTO: 5.9 10E9/L (ref 4–11)

## 2021-03-09 PROCEDURE — 36415 COLL VENOUS BLD VENIPUNCTURE: CPT | Performed by: PHYSICIAN ASSISTANT

## 2021-03-09 PROCEDURE — 84443 ASSAY THYROID STIM HORMONE: CPT | Performed by: PHYSICIAN ASSISTANT

## 2021-03-09 PROCEDURE — 85027 COMPLETE CBC AUTOMATED: CPT | Performed by: PHYSICIAN ASSISTANT

## 2021-03-09 PROCEDURE — 86803 HEPATITIS C AB TEST: CPT | Performed by: PHYSICIAN ASSISTANT

## 2021-03-09 PROCEDURE — 80053 COMPREHEN METABOLIC PANEL: CPT | Performed by: PHYSICIAN ASSISTANT

## 2021-03-09 PROCEDURE — 87389 HIV-1 AG W/HIV-1&-2 AB AG IA: CPT | Performed by: PHYSICIAN ASSISTANT

## 2021-03-09 PROCEDURE — 83036 HEMOGLOBIN GLYCOSYLATED A1C: CPT | Performed by: PHYSICIAN ASSISTANT

## 2021-03-11 LAB
ALBUMIN SERPL-MCNC: 4 G/DL (ref 3.4–5)
ALP SERPL-CCNC: 74 U/L (ref 40–150)
ALT SERPL W P-5'-P-CCNC: 79 U/L (ref 0–50)
ANION GAP SERPL CALCULATED.3IONS-SCNC: 4 MMOL/L (ref 3–14)
AST SERPL W P-5'-P-CCNC: 36 U/L (ref 0–45)
BILIRUB SERPL-MCNC: 1.3 MG/DL (ref 0.2–1.3)
BUN SERPL-MCNC: 13 MG/DL (ref 7–30)
CALCIUM SERPL-MCNC: 9 MG/DL (ref 8.5–10.1)
CHLORIDE SERPL-SCNC: 109 MMOL/L (ref 94–109)
CO2 SERPL-SCNC: 26 MMOL/L (ref 20–32)
CREAT SERPL-MCNC: 0.73 MG/DL (ref 0.52–1.04)
GFR SERPL CREATININE-BSD FRML MDRD: >90 ML/MIN/{1.73_M2}
GLUCOSE SERPL-MCNC: 79 MG/DL (ref 70–99)
HCV AB SERPL QL IA: NONREACTIVE
HIV 1+2 AB+HIV1 P24 AG SERPL QL IA: NONREACTIVE
POTASSIUM SERPL-SCNC: 4 MMOL/L (ref 3.4–5.3)
PROT SERPL-MCNC: 7 G/DL (ref 6.8–8.8)
SODIUM SERPL-SCNC: 139 MMOL/L (ref 133–144)
TSH SERPL DL<=0.005 MIU/L-ACNC: 1.25 MU/L (ref 0.4–4)

## 2021-10-10 ENCOUNTER — HEALTH MAINTENANCE LETTER (OUTPATIENT)
Age: 31
End: 2021-10-10

## 2021-11-30 ENCOUNTER — APPOINTMENT (OUTPATIENT)
Dept: URGENT CARE | Facility: URGENT CARE | Age: 31
End: 2021-11-30
Payer: COMMERCIAL

## 2021-11-30 ENCOUNTER — LAB REQUISITION (OUTPATIENT)
Dept: LAB | Facility: CLINIC | Age: 31
End: 2021-11-30

## 2021-11-30 PROCEDURE — U0003 INFECTIOUS AGENT DETECTION BY NUCLEIC ACID (DNA OR RNA); SEVERE ACUTE RESPIRATORY SYNDROME CORONAVIRUS 2 (SARS-COV-2) (CORONAVIRUS DISEASE [COVID-19]), AMPLIFIED PROBE TECHNIQUE, MAKING USE OF HIGH THROUGHPUT TECHNOLOGIES AS DESCRIBED BY CMS-2020-01-R: HCPCS | Performed by: INTERNAL MEDICINE

## 2021-12-01 LAB — SARS-COV-2 RNA RESP QL NAA+PROBE: NEGATIVE

## 2022-01-29 ENCOUNTER — HEALTH MAINTENANCE LETTER (OUTPATIENT)
Age: 32
End: 2022-01-29

## 2022-02-16 LAB
HEPATITIS B SURFACE ANTIGEN (EXTERNAL): NEGATIVE
HIV1+2 AB SERPL QL IA: NEGATIVE
RUBELLA ANTIBODY IGG (EXTERNAL): NORMAL
TREPONEMA PALLIDUM ANTIBODY (EXTERNAL): NONREACTIVE

## 2022-09-02 LAB — GROUP B STREPTOCOCCUS (EXTERNAL): NEGATIVE

## 2022-09-18 ENCOUNTER — HEALTH MAINTENANCE LETTER (OUTPATIENT)
Age: 32
End: 2022-09-18

## 2022-10-06 ENCOUNTER — HOSPITAL ENCOUNTER (INPATIENT)
Facility: CLINIC | Age: 32
LOS: 3 days | Discharge: HOME HEALTH | End: 2022-10-09
Attending: SPECIALIST | Admitting: SPECIALIST
Payer: COMMERCIAL

## 2022-10-06 PROBLEM — O48.0 POST-TERM PREGNANCY, 40-42 WEEKS OF GESTATION: Status: ACTIVE | Noted: 2022-10-06

## 2022-10-06 LAB
ABO/RH(D): NORMAL
ANTIBODY SCREEN: NEGATIVE
ERYTHROCYTE [DISTWIDTH] IN BLOOD BY AUTOMATED COUNT: 17.1 % (ref 10–15)
HCT VFR BLD AUTO: 30.4 % (ref 35–47)
HGB BLD-MCNC: 10.3 G/DL (ref 11.7–15.7)
MCH RBC QN AUTO: 30.5 PG (ref 26.5–33)
MCHC RBC AUTO-ENTMCNC: 33.9 G/DL (ref 31.5–36.5)
MCV RBC AUTO: 90 FL (ref 78–100)
PLATELET # BLD AUTO: 201 10E3/UL (ref 150–450)
RBC # BLD AUTO: 3.38 10E6/UL (ref 3.8–5.2)
SARS-COV-2 RNA RESP QL NAA+PROBE: NEGATIVE
SPECIMEN EXPIRATION DATE: NORMAL
WBC # BLD AUTO: 13.2 10E3/UL (ref 4–11)

## 2022-10-06 PROCEDURE — 85027 COMPLETE CBC AUTOMATED: CPT | Performed by: SPECIALIST

## 2022-10-06 PROCEDURE — 86780 TREPONEMA PALLIDUM: CPT | Performed by: SPECIALIST

## 2022-10-06 PROCEDURE — 86901 BLOOD TYPING SEROLOGIC RH(D): CPT | Performed by: SPECIALIST

## 2022-10-06 PROCEDURE — 250N000013 HC RX MED GY IP 250 OP 250 PS 637: Performed by: SPECIALIST

## 2022-10-06 PROCEDURE — U0005 INFEC AGEN DETEC AMPLI PROBE: HCPCS | Performed by: SPECIALIST

## 2022-10-06 PROCEDURE — 3E0P7VZ INTRODUCTION OF HORMONE INTO FEMALE REPRODUCTIVE, VIA NATURAL OR ARTIFICIAL OPENING: ICD-10-PCS | Performed by: SPECIALIST

## 2022-10-06 PROCEDURE — 120N000001 HC R&B MED SURG/OB

## 2022-10-06 PROCEDURE — 86850 RBC ANTIBODY SCREEN: CPT | Performed by: SPECIALIST

## 2022-10-06 RX ORDER — ONDANSETRON 2 MG/ML
4 INJECTION INTRAMUSCULAR; INTRAVENOUS EVERY 6 HOURS PRN
Status: DISCONTINUED | OUTPATIENT
Start: 2022-10-06 | End: 2022-10-07 | Stop reason: HOSPADM

## 2022-10-06 RX ORDER — NALOXONE HYDROCHLORIDE 0.4 MG/ML
0.4 INJECTION, SOLUTION INTRAMUSCULAR; INTRAVENOUS; SUBCUTANEOUS
Status: DISCONTINUED | OUTPATIENT
Start: 2022-10-06 | End: 2022-10-07 | Stop reason: HOSPADM

## 2022-10-06 RX ORDER — OXYTOCIN/0.9 % SODIUM CHLORIDE 30/500 ML
100-340 PLASTIC BAG, INJECTION (ML) INTRAVENOUS CONTINUOUS PRN
Status: DISCONTINUED | OUTPATIENT
Start: 2022-10-06 | End: 2022-10-08 | Stop reason: CLARIF

## 2022-10-06 RX ORDER — OXYTOCIN/0.9 % SODIUM CHLORIDE 30/500 ML
340 PLASTIC BAG, INJECTION (ML) INTRAVENOUS CONTINUOUS PRN
Status: DISCONTINUED | OUTPATIENT
Start: 2022-10-06 | End: 2022-10-07 | Stop reason: HOSPADM

## 2022-10-06 RX ORDER — PROCHLORPERAZINE 25 MG
25 SUPPOSITORY, RECTAL RECTAL EVERY 12 HOURS PRN
Status: DISCONTINUED | OUTPATIENT
Start: 2022-10-06 | End: 2022-10-07 | Stop reason: HOSPADM

## 2022-10-06 RX ORDER — OXYTOCIN 10 [USP'U]/ML
10 INJECTION, SOLUTION INTRAMUSCULAR; INTRAVENOUS
Status: DISCONTINUED | OUTPATIENT
Start: 2022-10-06 | End: 2022-10-08 | Stop reason: CLARIF

## 2022-10-06 RX ORDER — ACETAMINOPHEN 325 MG/1
650 TABLET ORAL EVERY 4 HOURS PRN
Status: DISCONTINUED | OUTPATIENT
Start: 2022-10-06 | End: 2022-10-07 | Stop reason: HOSPADM

## 2022-10-06 RX ORDER — NALOXONE HYDROCHLORIDE 0.4 MG/ML
0.2 INJECTION, SOLUTION INTRAMUSCULAR; INTRAVENOUS; SUBCUTANEOUS
Status: DISCONTINUED | OUTPATIENT
Start: 2022-10-06 | End: 2022-10-07 | Stop reason: HOSPADM

## 2022-10-06 RX ORDER — ONDANSETRON 4 MG/1
4 TABLET, ORALLY DISINTEGRATING ORAL EVERY 6 HOURS PRN
Status: DISCONTINUED | OUTPATIENT
Start: 2022-10-06 | End: 2022-10-07 | Stop reason: HOSPADM

## 2022-10-06 RX ORDER — OXYTOCIN 10 [USP'U]/ML
10 INJECTION, SOLUTION INTRAMUSCULAR; INTRAVENOUS
Status: DISCONTINUED | OUTPATIENT
Start: 2022-10-06 | End: 2022-10-07 | Stop reason: HOSPADM

## 2022-10-06 RX ORDER — METHYLERGONOVINE MALEATE 0.2 MG/ML
200 INJECTION INTRAVENOUS
Status: DISCONTINUED | OUTPATIENT
Start: 2022-10-06 | End: 2022-10-07 | Stop reason: HOSPADM

## 2022-10-06 RX ORDER — METOCLOPRAMIDE HYDROCHLORIDE 5 MG/ML
10 INJECTION INTRAMUSCULAR; INTRAVENOUS EVERY 6 HOURS PRN
Status: DISCONTINUED | OUTPATIENT
Start: 2022-10-06 | End: 2022-10-07 | Stop reason: HOSPADM

## 2022-10-06 RX ORDER — CITRIC ACID/SODIUM CITRATE 334-500MG
30 SOLUTION, ORAL ORAL
Status: DISCONTINUED | OUTPATIENT
Start: 2022-10-06 | End: 2022-10-07 | Stop reason: HOSPADM

## 2022-10-06 RX ORDER — KETOROLAC TROMETHAMINE 30 MG/ML
30 INJECTION, SOLUTION INTRAMUSCULAR; INTRAVENOUS
Status: DISCONTINUED | OUTPATIENT
Start: 2022-10-06 | End: 2022-10-08 | Stop reason: CLARIF

## 2022-10-06 RX ORDER — TRANEXAMIC ACID 10 MG/ML
1 INJECTION, SOLUTION INTRAVENOUS EVERY 30 MIN PRN
Status: DISCONTINUED | OUTPATIENT
Start: 2022-10-06 | End: 2022-10-07 | Stop reason: HOSPADM

## 2022-10-06 RX ORDER — MISOPROSTOL 200 UG/1
400 TABLET ORAL
Status: DISCONTINUED | OUTPATIENT
Start: 2022-10-06 | End: 2022-10-07 | Stop reason: HOSPADM

## 2022-10-06 RX ORDER — MISOPROSTOL 100 UG/1
25 TABLET ORAL EVERY 4 HOURS PRN
Status: DISCONTINUED | OUTPATIENT
Start: 2022-10-06 | End: 2022-10-07 | Stop reason: HOSPADM

## 2022-10-06 RX ORDER — CARBOPROST TROMETHAMINE 250 UG/ML
250 INJECTION, SOLUTION INTRAMUSCULAR
Status: DISCONTINUED | OUTPATIENT
Start: 2022-10-06 | End: 2022-10-07 | Stop reason: HOSPADM

## 2022-10-06 RX ORDER — MISOPROSTOL 200 UG/1
800 TABLET ORAL
Status: DISCONTINUED | OUTPATIENT
Start: 2022-10-06 | End: 2022-10-07 | Stop reason: HOSPADM

## 2022-10-06 RX ORDER — IBUPROFEN 400 MG/1
800 TABLET, FILM COATED ORAL
Status: DISCONTINUED | OUTPATIENT
Start: 2022-10-06 | End: 2022-10-07

## 2022-10-06 RX ORDER — TERBUTALINE SULFATE 1 MG/ML
0.25 INJECTION, SOLUTION SUBCUTANEOUS
Status: DISCONTINUED | OUTPATIENT
Start: 2022-10-06 | End: 2022-10-07 | Stop reason: HOSPADM

## 2022-10-06 RX ORDER — FENTANYL CITRATE 50 UG/ML
100 INJECTION, SOLUTION INTRAMUSCULAR; INTRAVENOUS
Status: DISCONTINUED | OUTPATIENT
Start: 2022-10-06 | End: 2022-10-07 | Stop reason: HOSPADM

## 2022-10-06 RX ORDER — PROCHLORPERAZINE MALEATE 5 MG
10 TABLET ORAL EVERY 6 HOURS PRN
Status: DISCONTINUED | OUTPATIENT
Start: 2022-10-06 | End: 2022-10-07 | Stop reason: HOSPADM

## 2022-10-06 RX ORDER — HYDROXYZINE HYDROCHLORIDE 25 MG/1
50 TABLET, FILM COATED ORAL
Status: DISCONTINUED | OUTPATIENT
Start: 2022-10-06 | End: 2022-10-07 | Stop reason: HOSPADM

## 2022-10-06 RX ORDER — METOCLOPRAMIDE 10 MG/1
10 TABLET ORAL EVERY 6 HOURS PRN
Status: DISCONTINUED | OUTPATIENT
Start: 2022-10-06 | End: 2022-10-07 | Stop reason: HOSPADM

## 2022-10-06 RX ADMIN — MISOPROSTOL 25 MCG: 100 TABLET ORAL at 20:48

## 2022-10-06 ASSESSMENT — ACTIVITIES OF DAILY LIVING (ADL)
ADLS_ACUITY_SCORE: 20
ADLS_ACUITY_SCORE: 20

## 2022-10-07 ENCOUNTER — ANESTHESIA (OUTPATIENT)
Dept: OBGYN | Facility: CLINIC | Age: 32
End: 2022-10-07
Payer: COMMERCIAL

## 2022-10-07 ENCOUNTER — ANESTHESIA EVENT (OUTPATIENT)
Dept: OBGYN | Facility: CLINIC | Age: 32
End: 2022-10-07
Payer: COMMERCIAL

## 2022-10-07 LAB — T PALLIDUM AB SER QL: NONREACTIVE

## 2022-10-07 PROCEDURE — 258N000003 HC RX IP 258 OP 636: Performed by: SPECIALIST

## 2022-10-07 PROCEDURE — 722N000001 HC LABOR CARE VAGINAL DELIVERY SINGLE

## 2022-10-07 PROCEDURE — 250N000013 HC RX MED GY IP 250 OP 250 PS 637: Performed by: SPECIALIST

## 2022-10-07 PROCEDURE — 250N000011 HC RX IP 250 OP 636: Performed by: ANESTHESIOLOGY

## 2022-10-07 PROCEDURE — 3E0R3BZ INTRODUCTION OF ANESTHETIC AGENT INTO SPINAL CANAL, PERCUTANEOUS APPROACH: ICD-10-PCS | Performed by: ANESTHESIOLOGY

## 2022-10-07 PROCEDURE — 370N000003 HC ANESTHESIA WARD SERVICE

## 2022-10-07 PROCEDURE — 120N000012 HC R&B POSTPARTUM

## 2022-10-07 PROCEDURE — 00HU33Z INSERTION OF INFUSION DEVICE INTO SPINAL CANAL, PERCUTANEOUS APPROACH: ICD-10-PCS | Performed by: ANESTHESIOLOGY

## 2022-10-07 PROCEDURE — 250N000009 HC RX 250: Performed by: SPECIALIST

## 2022-10-07 PROCEDURE — 0HQ9XZZ REPAIR PERINEUM SKIN, EXTERNAL APPROACH: ICD-10-PCS | Performed by: SPECIALIST

## 2022-10-07 PROCEDURE — 10907ZC DRAINAGE OF AMNIOTIC FLUID, THERAPEUTIC FROM PRODUCTS OF CONCEPTION, VIA NATURAL OR ARTIFICIAL OPENING: ICD-10-PCS | Performed by: SPECIALIST

## 2022-10-07 PROCEDURE — 250N000011 HC RX IP 250 OP 636: Performed by: SPECIALIST

## 2022-10-07 RX ORDER — OXYTOCIN/0.9 % SODIUM CHLORIDE 30/500 ML
1-24 PLASTIC BAG, INJECTION (ML) INTRAVENOUS CONTINUOUS
Status: DISCONTINUED | OUTPATIENT
Start: 2022-10-07 | End: 2022-10-07 | Stop reason: HOSPADM

## 2022-10-07 RX ORDER — HYDROCORTISONE 25 MG/G
CREAM TOPICAL 3 TIMES DAILY PRN
Status: DISCONTINUED | OUTPATIENT
Start: 2022-10-07 | End: 2022-10-09 | Stop reason: HOSPADM

## 2022-10-07 RX ORDER — ROPIVACAINE HYDROCHLORIDE 2 MG/ML
INJECTION, SOLUTION EPIDURAL; INFILTRATION; PERINEURAL
Status: COMPLETED | OUTPATIENT
Start: 2022-10-07 | End: 2022-10-07

## 2022-10-07 RX ORDER — ACETAMINOPHEN 325 MG/1
650 TABLET ORAL EVERY 4 HOURS PRN
Status: DISCONTINUED | OUTPATIENT
Start: 2022-10-07 | End: 2022-10-09 | Stop reason: HOSPADM

## 2022-10-07 RX ORDER — OXYTOCIN/0.9 % SODIUM CHLORIDE 30/500 ML
340 PLASTIC BAG, INJECTION (ML) INTRAVENOUS CONTINUOUS PRN
Status: DISCONTINUED | OUTPATIENT
Start: 2022-10-07 | End: 2022-10-09 | Stop reason: HOSPADM

## 2022-10-07 RX ORDER — OXYTOCIN 10 [USP'U]/ML
10 INJECTION, SOLUTION INTRAMUSCULAR; INTRAVENOUS
Status: DISCONTINUED | OUTPATIENT
Start: 2022-10-07 | End: 2022-10-09 | Stop reason: HOSPADM

## 2022-10-07 RX ORDER — TRANEXAMIC ACID 10 MG/ML
1 INJECTION, SOLUTION INTRAVENOUS EVERY 30 MIN PRN
Status: DISCONTINUED | OUTPATIENT
Start: 2022-10-07 | End: 2022-10-09 | Stop reason: HOSPADM

## 2022-10-07 RX ORDER — ONDANSETRON 4 MG/1
4 TABLET, ORALLY DISINTEGRATING ORAL EVERY 6 HOURS PRN
Status: DISCONTINUED | OUTPATIENT
Start: 2022-10-07 | End: 2022-10-07 | Stop reason: HOSPADM

## 2022-10-07 RX ORDER — LIDOCAINE 40 MG/G
CREAM TOPICAL
Status: DISCONTINUED | OUTPATIENT
Start: 2022-10-07 | End: 2022-10-07 | Stop reason: HOSPADM

## 2022-10-07 RX ORDER — MISOPROSTOL 200 UG/1
800 TABLET ORAL
Status: DISCONTINUED | OUTPATIENT
Start: 2022-10-07 | End: 2022-10-09 | Stop reason: HOSPADM

## 2022-10-07 RX ORDER — MISOPROSTOL 200 UG/1
400 TABLET ORAL
Status: DISCONTINUED | OUTPATIENT
Start: 2022-10-07 | End: 2022-10-09 | Stop reason: HOSPADM

## 2022-10-07 RX ORDER — NALBUPHINE HYDROCHLORIDE 10 MG/ML
2.5-5 INJECTION, SOLUTION INTRAMUSCULAR; INTRAVENOUS; SUBCUTANEOUS EVERY 6 HOURS PRN
Status: DISCONTINUED | OUTPATIENT
Start: 2022-10-07 | End: 2022-10-08 | Stop reason: CLARIF

## 2022-10-07 RX ORDER — IBUPROFEN 400 MG/1
800 TABLET, FILM COATED ORAL EVERY 6 HOURS PRN
Status: DISCONTINUED | OUTPATIENT
Start: 2022-10-07 | End: 2022-10-09 | Stop reason: HOSPADM

## 2022-10-07 RX ORDER — SODIUM CHLORIDE, SODIUM LACTATE, POTASSIUM CHLORIDE, CALCIUM CHLORIDE 600; 310; 30; 20 MG/100ML; MG/100ML; MG/100ML; MG/100ML
INJECTION, SOLUTION INTRAVENOUS CONTINUOUS PRN
Status: DISCONTINUED | OUTPATIENT
Start: 2022-10-07 | End: 2022-10-07 | Stop reason: HOSPADM

## 2022-10-07 RX ORDER — METHYLERGONOVINE MALEATE 0.2 MG/ML
200 INJECTION INTRAVENOUS
Status: DISCONTINUED | OUTPATIENT
Start: 2022-10-07 | End: 2022-10-09 | Stop reason: HOSPADM

## 2022-10-07 RX ORDER — BISACODYL 10 MG
10 SUPPOSITORY, RECTAL RECTAL DAILY PRN
Status: DISCONTINUED | OUTPATIENT
Start: 2022-10-07 | End: 2022-10-09 | Stop reason: HOSPADM

## 2022-10-07 RX ORDER — DOCUSATE SODIUM 100 MG/1
100 CAPSULE, LIQUID FILLED ORAL DAILY
Status: DISCONTINUED | OUTPATIENT
Start: 2022-10-07 | End: 2022-10-09 | Stop reason: HOSPADM

## 2022-10-07 RX ORDER — CARBOPROST TROMETHAMINE 250 UG/ML
250 INJECTION, SOLUTION INTRAMUSCULAR
Status: DISCONTINUED | OUTPATIENT
Start: 2022-10-07 | End: 2022-10-09 | Stop reason: HOSPADM

## 2022-10-07 RX ORDER — EPHEDRINE SULFATE 50 MG/ML
5 INJECTION, SOLUTION INTRAMUSCULAR; INTRAVENOUS; SUBCUTANEOUS
Status: DISCONTINUED | OUTPATIENT
Start: 2022-10-07 | End: 2022-10-07 | Stop reason: HOSPADM

## 2022-10-07 RX ORDER — ROPIVACAINE HYDROCHLORIDE 2 MG/ML
10 INJECTION, SOLUTION EPIDURAL; INFILTRATION; PERINEURAL ONCE
Status: DISCONTINUED | OUTPATIENT
Start: 2022-10-07 | End: 2022-10-07 | Stop reason: HOSPADM

## 2022-10-07 RX ORDER — MODIFIED LANOLIN
OINTMENT (GRAM) TOPICAL
Status: DISCONTINUED | OUTPATIENT
Start: 2022-10-07 | End: 2022-10-09 | Stop reason: HOSPADM

## 2022-10-07 RX ORDER — CITALOPRAM HYDROBROMIDE 20 MG/1
40 TABLET ORAL DAILY
Status: DISCONTINUED | OUTPATIENT
Start: 2022-10-07 | End: 2022-10-09 | Stop reason: HOSPADM

## 2022-10-07 RX ORDER — ONDANSETRON 2 MG/ML
4 INJECTION INTRAMUSCULAR; INTRAVENOUS EVERY 6 HOURS PRN
Status: DISCONTINUED | OUTPATIENT
Start: 2022-10-07 | End: 2022-10-07 | Stop reason: HOSPADM

## 2022-10-07 RX ADMIN — MISOPROSTOL 25 MCG: 100 TABLET ORAL at 00:48

## 2022-10-07 RX ADMIN — HYDROXYZINE HYDROCHLORIDE 50 MG: 25 TABLET, FILM COATED ORAL at 00:47

## 2022-10-07 RX ADMIN — FENTANYL CITRATE 100 MCG: 50 INJECTION, SOLUTION INTRAMUSCULAR; INTRAVENOUS at 09:21

## 2022-10-07 RX ADMIN — MISOPROSTOL 25 MCG: 100 TABLET ORAL at 05:52

## 2022-10-07 RX ADMIN — FENTANYL CITRATE 100 MCG: 50 INJECTION, SOLUTION INTRAMUSCULAR; INTRAVENOUS at 05:49

## 2022-10-07 RX ADMIN — METOCLOPRAMIDE 10 MG: 5 INJECTION, SOLUTION INTRAMUSCULAR; INTRAVENOUS at 08:16

## 2022-10-07 RX ADMIN — FENTANYL CITRATE 100 MCG: 50 INJECTION, SOLUTION INTRAMUSCULAR; INTRAVENOUS at 04:34

## 2022-10-07 RX ADMIN — Medication 2 MILLI-UNITS/MIN: at 17:30

## 2022-10-07 RX ADMIN — SODIUM CHLORIDE, POTASSIUM CHLORIDE, SODIUM LACTATE AND CALCIUM CHLORIDE 500 ML: 600; 310; 30; 20 INJECTION, SOLUTION INTRAVENOUS at 12:11

## 2022-10-07 RX ADMIN — MISOPROSTOL 25 MCG: 100 TABLET ORAL at 10:05

## 2022-10-07 RX ADMIN — FENTANYL CITRATE 100 MCG: 50 INJECTION, SOLUTION INTRAMUSCULAR; INTRAVENOUS at 08:16

## 2022-10-07 RX ADMIN — FENTANYL CITRATE 100 MCG: 50 INJECTION, SOLUTION INTRAMUSCULAR; INTRAVENOUS at 02:31

## 2022-10-07 RX ADMIN — ACETAMINOPHEN 650 MG: 325 TABLET, FILM COATED ORAL at 20:28

## 2022-10-07 RX ADMIN — IBUPROFEN 800 MG: 400 TABLET ORAL at 20:28

## 2022-10-07 RX ADMIN — ROPIVACAINE HYDROCHLORIDE 10 ML: 2 INJECTION, SOLUTION EPIDURAL; INFILTRATION at 12:37

## 2022-10-07 RX ADMIN — FENTANYL CITRATE 100 MCG: 50 INJECTION, SOLUTION INTRAMUSCULAR; INTRAVENOUS at 10:44

## 2022-10-07 RX ADMIN — Medication: at 12:34

## 2022-10-07 RX ADMIN — CITALOPRAM HYDROBROMIDE 40 MG: 40 TABLET ORAL at 20:28

## 2022-10-07 RX ADMIN — SODIUM CHLORIDE, POTASSIUM CHLORIDE, SODIUM LACTATE AND CALCIUM CHLORIDE 500 ML: 600; 310; 30; 20 INJECTION, SOLUTION INTRAVENOUS at 08:18

## 2022-10-07 ASSESSMENT — ACTIVITIES OF DAILY LIVING (ADL)
ADLS_ACUITY_SCORE: 20

## 2022-10-07 NOTE — PROGRESS NOTES
"OB progress  Danisha Blood    Pt became exhausted very uncomfortable so received epidural. Was  last check with last cytotec at 10am.  Now getting comfortable with epidural    /70   Temp 97.5  F (36.4  C) (Temporal)   Resp 16   Ht 1.65 m (5' 4.96\")   Wt 92.5 kg (204 lb)   LMP 2021 (Exact Date)   SpO2 97%   BMI 33.99 kg/m    AAOx3, tired  cerix 5-6/100/+1 AROM with cervical exam, thick mec    , moderate variability, decel to 95 after arom x 2 min but now back up to   115/ baseline  [place] q 2-4 min    A: 32 year old  @ 41w2d IOL  S.p 4 doses Cytotec now active labor  Meconium  gbs neg    P: encouraged rest  Will recheck 2 hours/ prn  NNP for del    Electronically signed by  Alice Hoffman DO  12:56 PM  10/7/2022  [facility]      "

## 2022-10-07 NOTE — PROGRESS NOTES
"Labor Note:  S: I assumed care at 17:00 hrs from Dr. Hoffman.  Pt feeling more pressure.    O: BP 98/51   Temp 97.7  F (36.5  C)   Resp 16   Ht 1.65 m (5' 4.96\")   Wt 92.5 kg (204 lb)   LMP 12/22/2021 (Exact Date)   SpO2 96%   BMI 33.99 kg/m    FHT: 120s, mod victor manuel, +accels, occas variable decel, prolonged decel to the 80-90s x 2 with first 2 practice pushes (1st contraction was mild and second more moderate with better descent of head)  [place]: Q3-7  Hard to   SVE: C/C/+2 RENETTA  A&P: 33 yo G1 41w2d admitted for post-dates indxn  1. Indxn-s/p 4 doses of Cytotec and got to 5-6cm.  Then, amniotomy performed and progressed now to C/C/+2 station.  Head is low, but pt still will need to push some and ctxs very irregular.  Also had a prolonged decel with both practice pushes.   Recommend starting some Pitocin and waiting at least 15-30 more minutes for ctxs to get more frequent.  Briefly mentioned that if baby has a prolonged deceleration and the baby doesn't recover, then I would recommend VAVD, as head is definitely low enough.  Did not review risks yet as seems very premature but explained that was the main reason I wanted Pitocin initiated so that she would have more consistently strong ctxs to help expedite delivery if needed.    2. FWB-overall very reassuring thoughout induction, though as just noted, 2 prolonged decelerations.  Recovered now.  Will follow closely.    3. Pain-feeling increasing pressure but tolerable per pt.  4. GBS & Covid neg    Electronically signed by Joana Finch MD  Call [phone] to have me paged or find out who's darryl Fournier OB/GYN  [facility]   October 7, 2022  5:34 PM             "

## 2022-10-07 NOTE — PROGRESS NOTES
"OB Progress  Danisha Blood    Pt admitted last night for cervial ripening.  Received 25mcg cytoec at ~845, 0045, and 550. Some doses held due to frequent contractions  Received fentanyl at 230, 430, 550 and 815.  Currently nauseated given reglan at 0815    S: feeling very umfortable. Didn't expect contractions to be this bad. Fentanyl helps for about an hour. Nauseate, Just gave reglan, helping some. Has slept in little bits.   O: /70   Temp 97.5  F (36.4  C) (Temporal)   Resp 16   Ht 1.65 m (5' 4.96\")   Wt 92.5 kg (204 lb)   LMP 2021 (Exact Date)   SpO2 97%   BMI 33.99 kg/m    AAOx3, nad but uncomfortable and tired  Cervix 1.5/80/-2 soft post  , reactive, occ variable, category I. + scalp stim with exam  TOCO q 2 - 4 min    A: 32 year old  @ 41w2d IOL for post-dates  S/p 3 doses of cytotec. Has ripenined nicely overnight but still has some more to do  GBS neg  covid neg    P: cont cytotec for now. Will maybe add hansen or AROM in next few hours depending on cervical change.  reveiwed options fo oscar med and expectations for fentanyl    Electronically signed by  Alice Hoffman DO  9:04 AM  10/7/2022  [facility]        "

## 2022-10-07 NOTE — ANESTHESIA PROCEDURE NOTES
Epidural catheter Procedure Note    Pre-Procedure   Staff -        Anesthesiologist:  Rosas Randhawa MD       Performed By: anesthesiologist       Location: OB       Pre-Anesthestic Checklist: patient identified, IV checked, risks and benefits discussed, informed consent, monitors and equipment checked, pre-op evaluation and at physician/surgeon's request  Timeout:       Correct Patient: Yes        Correct Procedure: Yes        Correct Site: Yes        Correct Position: Yes   Procedure Documentation  Procedure: epidural catheter       Patient Position: sitting       Patient Prep/Sterile Barriers: sterile gloves, mask, patient draped       Skin prep: Betadine       Local skin infiltrated with mL of 1% lidocaine.        Insertion Site: L4-5. (midline approach).       Technique: LORT saline        CHOCO at 6 cm.       Needle Type: ToOffScaley needle       Needle Gauge: 17.        Needle Length (Inches): 3.5           Catheter threaded easily.           Threaded 11 cm at skin.         # of attempts: 1 and  # of redirects:     Assessment/Narrative         Paresthesias: No.       Test dose of 3 mL lidocaine 1.5% w/ 1:200,000 epinephrine at.         Test dose negative, 3 minutes after injection, for signs of intravascular, subdural, or intrathecal injection.       Insertion/Infusion Method: LORT saline       Aspiration negative for Heme or CSF via Epidural Catheter.    Medication(s) Administered   0.2% Ropivacaine (Epidural) - EPIDURAL   10 mL - 10/7/2022 12:37:00 PM   Comments:  No complications.  Catheter secured with sterile dressing and tape.  Questions answered.    Orders to manage the epidural infusion have been entered and, through coordination with the nurse, we will continue to manage and monitor the patient's labor epidural.  We will continuously be available to adjust as needed throughout the entire labor and delivery process.

## 2022-10-07 NOTE — ANESTHESIA PREPROCEDURE EVALUATION
Anesthesia Pre-Procedure Evaluation    Patient: Danisha Blood   MRN: [id number] : 1990        Procedure : * No procedures listed *          Past Medical History:   Diagnosis Date     Depression       Past Surgical History:   Procedure Laterality Date     NO HISTORY OF SURGERY        No Known Allergies   Social History     Tobacco Use     Smoking status: Current Every Day Smoker     Smokeless tobacco: Never Used   Substance Use Topics     Alcohol use: Yes     Comment: Once a day      Wt Readings from Last 1 Encounters:   10/06/22 92.5 kg (204 lb)           Physical Exam    Airway        Mallampati: II       Respiratory Devices and Support         Dental           Cardiovascular   cardiovascular exam normal          Pulmonary   pulmonary exam normal                OUTSIDE LABS:  CBC:   Lab Results   Component Value Date    WBC 13.2 (H) 10/06/2022    WBC 5.9 2021    HGB 10.3 (L) 10/06/2022    HGB 11.8 2021    HCT 30.4 (L) 10/06/2022    HCT 34.8 (L) 2021     10/06/2022     2021     BMP:   Lab Results   Component Value Date     2021     2019    POTASSIUM 4.0 2021    POTASSIUM 3.4 2019    CHLORIDE 109 2021    CHLORIDE 108 2019    CO2 26 2021    CO2 23 2019    BUN 13 2021    BUN 11 2019    CR 0.73 2021    CR 0.69 2019    GLC 79 2021    GLC 80 2019     COAGS: No results found for: PTT, INR, FIBR  POC: No results found for: BGM, HCG, HCGS  HEPATIC:   Lab Results   Component Value Date    ALBUMIN 4.0 2021    PROTTOTAL 7.0 2021    ALT 79 (H) 2021    AST 36 2021    ALKPHOS 74 2021    BILITOTAL 1.3 2021     OTHER:   Lab Results   Component Value Date    A1C 4.1 2021    ARIA 9.0 2021    TSH 1.25 2021       Anesthesia Plan    ASA Status:  2      Anesthesia Type: Epidural.              Consents    Anesthesia Plan(s) and associated  risks, benefits, and realistic alternatives discussed. Questions answered and patient/representative(s) expressed understanding.    - Discussed:     - Discussed with:  Patient         Postoperative Care            Comments:                DIPESH CORDERO MD

## 2022-10-08 PROCEDURE — 250N000013 HC RX MED GY IP 250 OP 250 PS 637: Performed by: SPECIALIST

## 2022-10-08 PROCEDURE — 120N000012 HC R&B POSTPARTUM

## 2022-10-08 RX ADMIN — DOCUSATE SODIUM 100 MG: 100 CAPSULE, LIQUID FILLED ORAL at 08:30

## 2022-10-08 RX ADMIN — ACETAMINOPHEN 650 MG: 325 TABLET, FILM COATED ORAL at 15:19

## 2022-10-08 RX ADMIN — ACETAMINOPHEN 650 MG: 325 TABLET, FILM COATED ORAL at 21:19

## 2022-10-08 RX ADMIN — IBUPROFEN 800 MG: 400 TABLET ORAL at 21:19

## 2022-10-08 RX ADMIN — CITALOPRAM HYDROBROMIDE 40 MG: 40 TABLET ORAL at 19:47

## 2022-10-08 RX ADMIN — IBUPROFEN 800 MG: 400 TABLET ORAL at 15:20

## 2022-10-08 RX ADMIN — ACETAMINOPHEN 650 MG: 325 TABLET, FILM COATED ORAL at 08:30

## 2022-10-08 RX ADMIN — IBUPROFEN 800 MG: 400 TABLET ORAL at 08:30

## 2022-10-08 ASSESSMENT — ACTIVITIES OF DAILY LIVING (ADL)
ADLS_ACUITY_SCORE: 18
ADLS_ACUITY_SCORE: 20
ADLS_ACUITY_SCORE: 18
ADLS_ACUITY_SCORE: 18
ADLS_ACUITY_SCORE: 20
ADLS_ACUITY_SCORE: 18
ADLS_ACUITY_SCORE: 20

## 2022-10-08 NOTE — L&D DELIVERY NOTE
OB Vaginal Delivery Note    Danisha Blood MRN# [id number]   Age: 32 year old YOB: 1990       GA: 41w2d  GP:   Labor Complications: None   EBL:   mL  Delivery QBL: 215 mL  Delivery Type: Vaginal, Spontaneous   ROM to Delivery Time: (Delivered) Hours: 5 Minutes: 27   Weight: 3.232 kg (7 lb 2 oz)    1 Minute 5 Minute 10 Minute   Apgar Totals: 8   9        KARISHMA FRANCISCO     Delivery Details:  Danisha Blood, a 32 year old  female admitted at 41 weeks for post-dates induction of labor.  Her cervix was ripened with 4 doses of Cytotec, after which she progressed to 5-6cm dilatation.  Amniotomy was performed with meconium noted.  She progressed 3 hr and 40 minutes later to complete dilatation.  She was very numb and so labored down for one hour.  At approximately 17:30 hrs, she was checked and found to be C/C/+2. Her contractions were irregular but given how low the head was, we did encourage the patient to push through 2 contractions.  The patient had good effort but the contractions were not consistently strong nor frequent.  Additionally, the fetal tracing was notable for 2 prolonged decelerations to the 70-80s during the 2 practice pushes.  Therefore, I recommend we initiate Pitocin to help get the patient's contractions for frequent and consistently strong in case a VAVD was needed.  We waited another 40 minutes, at which point, the patient was feeling a lot more pressure.  During this time, the tracing was notable for moderate variability with occasional brief, variable decelerations.   At 18:10 hours, the patient was rechecked and C/C/+3.  She was instructed to push and pushed very effectively through 2 contractions going on deliver via  a viable, female infant with apgars 8 and 9 under epidural anesthesia.   No dystocia. No nuchal cord.  The cord was clamped and cut after an intentional 2 minute delay.  The placenta delivered spont & intact with 3VC shortly thereafter.  The  placenta appears normal.  The cord was quite short and looked thin and slightly constricted suggestive of decreased magdaleno's jelly.  The tracing was again notable for 2 additional prolonged decelerations during the 2 contractions the patient pushed and delivered through.  Cord gases were not sent, as the baby was quite vigorous and appeared very healthy and the tracing outside of the pushing was very reassuring..    A small 1st degree perineal laceration was noted as well as small right sulcal laceration.  Both were very minimal.  These were repaired with 3-0 Vicryl suture respectively.  Sponge, lap, & needle counts correct X2.  Pt and baby were doing well at completion of case.          Marla Blood-Danisha [[id number]]    Labor Event Times    Labor onset date: 10/7/22 Onset time: 12:00 PM   Dilation complete date: 10/7/22 Complete time:  4:30 PM   Start pushing date/time: 10/7/2022 1810      Labor Events     labor?: No   steroids: None  Labor Type: Induction/Cervical ripening, AROM  Predominate monitoring during 1st stage: continuous electronic fetal monitoring     Antibiotics received during labor?: No     Rupture date/time: 10/7/22 1250   Rupture type: Artificial Rupture of Membranes  Fluid color: Meconium     Induction: Misoprostol, AROM  Induction date/time:     Cervical ripening date/time: 10/6/22 1930   Indications for induction: Post-term Gestation     Augmentation: Oxytocin  Indications for augmentation: Ineffective Contraction Pattern     Delivery/Placenta Date and Time    Delivery Date: 10/7/22 Delivery Time:  6:17 PM   Placenta Date/Time: 10/7/2022  6:25 PM  Oxytocin given at the time of delivery: after delivery of baby  Delivering clinician: Joana Finch MD   Other personnel present at delivery:  Provider Role   Kathy Huffman RN          Vaginal Counts     Initial count performed by 2 team members:  Two Team Members   digna finch       Needles Suture Needles Sponges  "(RETIRED) Instruments   Initial counts 2 0 5    Added to count 0 2 0    Relief counts       Final counts 2 0 5          Placed during labor Accounted for at the end of labor   FSE NA    IUPC NA    Cervidil NA               Final count performed by 2 team members:  Two Team Members   same      Final count correct?: Yes     Apgars    Living status: Living   1 Minute 5 Minute 10 Minute 15 Minute 20 Minute   Skin color: 0  1       Heart rate: 2  2       Reflex irritability: 2  2       Muscle tone: 2  2       Respiratory effort: 2  2       Total: 8  9       Apgars assigned by: CASTILLO FRANCISCO RN     Cord    Vessels: 3 Vessels    Cord Complications: None               Cord Blood Disposition: Lab    Gases Sent?: No    Delayed cord clamping?: Yes    Cord Clamping Delay (seconds): >120 seconds    Stem cell collection?: No        Resuscitation    Methods: None  Output in Delivery Room: Stool      Measurements    Weight: 7 lb 2 oz Length: 1' 8\"   Head circumference: 31.1 cm    Output in delivery room: Stool     Labor Events and Shoulder Dystocia    Fetal Tracing Prior to Delivery: Category 2  Fetal Tracing Comments: Moderate variability with occas variable decelerations and a few prolonged decelerations during pushing  Shoulder dystocia present?: Neg     Delivery (Maternal) (Provider to Complete) ([id number])    Episiotomy: None  Perineal lacerations: 1st Repaired?: Yes   Sulcus laceration: right Repaired?: Yes   Repair suture: 3-0 Vicryl  Number of repair packets: 2  Genital tract inspection done: Pos     Blood Loss  Mother: Ave Blood Grace #[id number]   Start of Mother's Information    Delivery Blood Loss  10/07/22 1200 - 10/07/22 1937    Delivery QBL (mL) Hospital Encounter 215 mL    Total  215 mL         End of Mother's Information  Mother: Ave Blood #[id number]          Delivery - Provider to Complete ([id number])    Delivering clinician: Joana Finch MD  Attempted Delivery Types (Choose all that apply): " Spontaneous Vaginal Delivery  Delivery Type (Choose the 1 that will go to the Birth History): Vaginal, Spontaneous                   Other personnel:  Provider Role   Kathy Huffman RN                 Placenta    Date/Time: 10/7/2022  6:25 PM  Removal: Spontaneous  Comments: Cord was notably short and also very thin in consistency suggestive of poor magdaleno's jelly  Disposition: Hospital disposal           Anesthesia    Method: Epidural                Presentation and Position    Presentation: Vertex    Position: Left Occiput Anterior               Electronically signed by Joana Finch MD  Call [phone] to have me paged or find out who's darryl Fournier OB/GYN  [facility]   October 7, 2022  7:49 PM

## 2022-10-08 NOTE — ANESTHESIA POSTPROCEDURE EVALUATION
Patient: Danisha Blood    Procedure: * No procedures listed *       Anesthesia Type:  Epidural    Note:  Disposition: Inpatient   Postop Pain Control:    PONV:    Neuro/Psych:    Airway/Respiratory:    CV/Hemodynamics:    Other NRE:    DID A NON-ROUTINE EVENT OCCUR?     Event details/Postop Comments:  The patient was satisfied with her labor epidural and had no complaints. She is able to ambulate and denies urinary or bowel complaints.            Last vitals:  Vitals:    10/07/22 2200 10/08/22 0900 10/08/22 1600   BP: 101/58 104/61 105/59   Pulse: 95 66 67   Resp: 16 18 16   Temp: 37.2  C (99  F) 36.8  C (98.2  F) 36.2  C (97.2  F)   SpO2:          Electronically Signed By: Carlito Currie MD  October 8, 2022  4:41 PM

## 2022-10-08 NOTE — PLAN OF CARE
Data: Danisha Blood transferred to [facility] via wheelchair at 2110. Baby transferred via parent's arms.  Action: Receiving unit notified of transfer: Yes. Patient and family notified of room change. Report given to Coco RINCON at 2110. Belongings sent to receiving unit. Accompanied by Registered Nurse. Oriented patient to surroundings. Call light within reach. ID bands double-checked with receiving RN.  Response: Patient tolerated transfer and is stable.

## 2022-10-08 NOTE — LACTATION NOTE
"Lactation visit with Ave, FOB, and baby girl.    Infant had mec at delivery and has been deep suctioned since birth for additional mec fluid. Infant had been very spitty and not wanting to breastfeed. Infant was wakeful and rooting this morning when LC visits. Ave has large breasts and everted nipples but they are fairly short and blend into breast tissue. After minutes and various interventions to get infant latched without a nipple shield,  introduces nipple shield and we had more success with consistent suck patterning.     Ave had been holding infant cross cradle but Ave seems uncomfortable with her positioning.  suggests football hold, but Ave said infant didn't tolerate that position well last night because she was so spitty.  suggests we try football again since infant has been suctioned and has been less gaggy/spitty. Ave states she is more comfortable in football hold and she is able to more easily get infant latched in this position and support her breast.    Infant began to figure things out, she was opening her mouth, played around with the shield in her mouth, and then would \"attack and suckle\". Ave feeling better about this feeding and is hopeful this is the \"beginning\" of better feedings to come!     Highlighted  breastfeeding basics:   1) Watch for early feeding cues (licking lips, stirring or rooting, sucking movement with mouth, hands to mouth) and always breast feed on DEMAND.  2) Infant should breastfeed a minimum of 8 times in 24 hours. If it has been 3 hours since last breast feeding session, un-swaddle infant and begin skin to skin to entice infant to nurse.    Reviewed breast feeding section in our \"Guide to Postpartum and [place] Care.\" Highlighting page that educates to  feeding patterns/behavior: paying special attention to understanding infant's cluster-feeding (when and why's). Educated on nutritive vs non-nutritive suckling patterns. Showed how to record infant " "feedings along with voids and stools in the provided feeding log.     Reviewed breastfeeding positions and techniques to obtain/maintain deep latch (including nose to nipple alignment and supporting infant's shoulder blades vs head when bringing infant in to latch). Discussed BF should feel like a strong \"tug or pull\" when infant is suckling and if mother experiences a \"pinching or biting\" sensation, how to un-latch infant properly, assess nipple shape and make any necessary adjustments with positioning before re-latching.     Discussed normal infant weight loss and when infant should be back to birth weight. Stressed the importance of continuing to track infant's feeds and void/stools patterns, at least until infant has returned to his birth weight.    Appreciative of visit.    No Londono RN, IBCLC            "

## 2022-10-08 NOTE — PROGRESS NOTES
Postpartum progress note  PPD#1 s/p     S:  Pain controlled.  Bottom a little sore but overall manageable.  Ambulating and voiding without difficulty.  Lochia is appropriate.  Working on breastfeeding.  Baby has been sleepy so that has been harder.  S/p lactation consult.    O:  Vitals:    10/07/22 2115 10/07/22 2200 10/08/22 0900 10/08/22 1600   BP:  101/58 104/61 105/59   BP Location:  Right arm     Patient Position:  Semi-Castillo's     Cuff Size:  Adult Regular Adult Regular    Pulse:  95 66 67   Resp:  16 18 16   Temp: 97.2  F (36.2  C) 99  F (37.2  C) 98.2  F (36.8  C) 97.2  F (36.2  C)   TempSrc:  Oral Oral Axillary   SpO2:       Weight:       Height:         Gen: NAD  Resp: No increased work of breathing  Abd: soft, nontender  Ext: warm, well-perfused    Hemoglobin   Date Value Ref Range Status   10/06/2022 10.3 (L) 11.7 - 15.7 g/dL Final     A/P: 33 y/o G1 PPD#1 s/p , doing well postpartum   1. PPD#1  -Routine care    2. Female infant  -Working on breastfeeding    3. Rh positive/Rubella immune/S/p recommended vaccinations    4. Anxiety  -Continue Citalopram    5. BCM  -To be discussed at postpartum visit    6. Dispo  -Inpatient, anticipate discharge home tomorrow AM    Electronically signed by:  Danisha Palumbo  [facility] Office  Pager: [phone]  2022

## 2022-10-08 NOTE — PLAN OF CARE
Vss,RA.  LS clear.  UO adequate.  Declined need for pain medication.  Breastfeeding assistance provided.  Significant other bedside and supportive.

## 2022-10-09 VITALS
WEIGHT: 204 LBS | HEART RATE: 56 BPM | OXYGEN SATURATION: 96 % | DIASTOLIC BLOOD PRESSURE: 60 MMHG | SYSTOLIC BLOOD PRESSURE: 101 MMHG | TEMPERATURE: 97.9 F | BODY MASS INDEX: 33.99 KG/M2 | HEIGHT: 65 IN | RESPIRATION RATE: 18 BRPM

## 2022-10-09 PROCEDURE — 250N000013 HC RX MED GY IP 250 OP 250 PS 637: Performed by: SPECIALIST

## 2022-10-09 RX ORDER — FERROUS SULFATE 325(65) MG
325 TABLET ORAL
COMMUNITY
Start: 2022-10-09 | End: 2024-09-24

## 2022-10-09 RX ORDER — ACETAMINOPHEN 325 MG/1
650 TABLET ORAL EVERY 4 HOURS PRN
COMMUNITY
Start: 2022-10-09

## 2022-10-09 RX ORDER — PRENATAL VIT/IRON FUM/FOLIC AC 27MG-0.8MG
1 TABLET ORAL DAILY
Qty: 90 TABLET | Refills: 3 | COMMUNITY
Start: 2022-10-09 | End: 2024-09-24

## 2022-10-09 RX ORDER — DOCUSATE SODIUM 100 MG/1
100 CAPSULE, LIQUID FILLED ORAL 2 TIMES DAILY PRN
COMMUNITY
Start: 2022-10-09 | End: 2024-09-24

## 2022-10-09 RX ORDER — IBUPROFEN 200 MG
600 TABLET ORAL EVERY 6 HOURS PRN
COMMUNITY
Start: 2022-10-09 | End: 2024-09-24

## 2022-10-09 RX ADMIN — DOCUSATE SODIUM 100 MG: 100 CAPSULE, LIQUID FILLED ORAL at 11:05

## 2022-10-09 RX ADMIN — ACETAMINOPHEN 650 MG: 325 TABLET, FILM COATED ORAL at 03:24

## 2022-10-09 RX ADMIN — ACETAMINOPHEN 650 MG: 325 TABLET, FILM COATED ORAL at 11:05

## 2022-10-09 RX ADMIN — IBUPROFEN 800 MG: 400 TABLET ORAL at 03:24

## 2022-10-09 RX ADMIN — IBUPROFEN 800 MG: 400 TABLET ORAL at 11:05

## 2022-10-09 ASSESSMENT — ACTIVITIES OF DAILY LIVING (ADL)
ADLS_ACUITY_SCORE: 18

## 2022-10-09 NOTE — PLAN OF CARE
Vital signs stable. Postpartum assessment WDL. Pain controlled with tylenol and ibuprofen. Patient voiding without difficulty. Breastfeeding on cue with assist, using shield, pumping after feeds. Patient and infant bonding well. Will continue with current plan of care.

## 2022-10-09 NOTE — PROGRESS NOTES
[facility] Obstetrics Post-Partum Progress Note          Assessment and Plan:    Assessment:   Post-partum day #2  Normal spontaneous vaginal delivery  L&D complications: None      Doing well.  No immediate surgical complications identified.  No excessive bleeding  Pain well-controlled.      Plan:   Breast feeding strategies discussed. F/u lactation at peds  Reportable signs and symptoms dicussed with the patient  Discharge later today  Cont iron for a couple weeks then ok to stop.  Cont OTC meds for pain. Cont stool softeners.   F?u 2 and 6 w           Interval History:   Doing well.  Pain is well-controlled.  No fevers.  Good appetite.  Denies chest pain, shortness of breath, nausea or vomiting.  Vaginal bleeding is similar to a heavy menstrual flow.  Ambulatory.  Breastfeeding so-so.  Baby workign on latch, mom using shield. Supplementing due to jaundice.  Mom pumping after feeds.  Hasn't had BM. Taking stool softeners. Wonder s if needs to continue iron.   Baby going home with bili blanket today.          Significant Problems:      Patient Active Problem List   Diagnosis     Recurrent major depressive disorder, in partial remission (H)     Post-term pregnancy, 40-42 weeks of gestation     Allergic rhinitis     Normal vaginal delivery     Abnormal fetal heart rate, delivered     Short umbilical cord             Review of Systems:    The patient denies any chest pain, shortness of breath, excessive pain, fever, chills, purulent drainage from the wound, nausea or vomiting.          Medications:   All medications related to the patient's surgery have been reviewed          Physical Exam:     All vitals stable  Patient Vitals for the past 8 hrs:   BP Temp Temp src Pulse Resp   10/09/22 0751 101/60 97.9  F (36.6  C) Oral 56 18     Pt not examined. Sitting in upright chair near [facility].          Data:     All laboratory data related to this surgery reviewed  Hemoglobin   Date Value Ref Range Status    10/06/2022 10.3 (L) 11.7 - 15.7 g/dL Final   03/09/2021 11.8 11.7 - 15.7 g/dL Final   12/13/2019 12.2 11.7 - 15.7 g/dL Final     No imaging studies have been ordered    Yani Tijerina MD   Electronically signed by   Yani Tijerina MD  [facility]

## 2022-10-09 NOTE — DISCHARGE SUMMARY
[facility] Discharge Summary    Danisha Blood MRN# [id number]   Age: 32 year old YOB: 1990     Date of Admission:  10/6/2022  Date of Discharge::  10/9/2022  Admitting Physician:  Alice Hoffman DO  Discharge Physician:  Yani Tijerina MD     Home clinic: [place] [facility]          Admission Diagnoses:   Post-term pregnancy, 40-42 weeks of gestation [O48.0]          Discharge Diagnosis:   Normal spontaneous vaginal delivery  Intrauterine pregnancy at 41 weeks gestation          Procedures:   Procedure(s): No additional procedures performed       No other procedures performed during this admission           Medications Prior to Admission:     Medications Prior to Admission   Medication Sig Dispense Refill Last Dose     citalopram (CELEXA) 20 MG tablet Take 2 tablets (40 mg) by mouth daily   10/6/2022 at 1900             Discharge Medications:     Current Discharge Medication List      START taking these medications    Details   acetaminophen (TYLENOL) 325 MG tablet Take 2 tablets (650 mg) by mouth every 4 hours as needed for mild pain or fever (greater than or equal to 38  C /100.4  F (oral) or 38.5  C/ 101.4  F (core).)    Associated Diagnoses: Normal vaginal delivery      docusate sodium (COLACE) 100 MG capsule Take 1 capsule (100 mg) by mouth 2 times daily as needed for constipation    Associated Diagnoses: Normal vaginal delivery      ferrous sulfate (FEROSUL) 325 (65 Fe) MG tablet Take 1 tablet (325 mg) by mouth daily (with breakfast)    Comments: Any daily iron supplement ok  Associated Diagnoses: Normal vaginal delivery      ibuprofen (ADVIL/MOTRIN) 200 MG tablet Take 3 tablets (600 mg) by mouth every 6 hours as needed for other (cramping)    Associated Diagnoses: Normal vaginal delivery      Prenatal Vit-Fe Fumarate-FA (PRENATAL MULTIVITAMIN W/IRON) 27-0.8 MG tablet Take 1 tablet by mouth daily  Qty: 90 tablet, Refills: 3    Associated Diagnoses: Normal vaginal  delivery         CONTINUE these medications which have NOT CHANGED    Details   citalopram (CELEXA) 20 MG tablet Take 2 tablets (40 mg) by mouth daily    Associated Diagnoses: Recurrent major depressive disorder, in partial remission (H)                   Consultations:   No consultations were requested during this admission          Brief History of Labor:   She was admitted for postdates IOL.  She received cyctotec and then AROM and went into anjel.  She got an epidural. She had an uncomplciated .            Hospital Course:   The patient's hospital course was unremarkable.  On discharge, her pain was well controlled. Vaginal bleeding is similar to peak menstrual flow.  Voiding without difficulty.  Ambulating well and tolerating a normal diet.  No fever.  Breastfeeding going ok.  Infant is stable on biliblanket.  No bowel movement yet.*  She was discharged on post-partum day #2.    Post-partum hemoglobin:   Hemoglobin   Date Value Ref Range Status   10/06/2022 10.3 (L) 11.7 - 15.7 g/dL Final   2021 11.8 11.7 - 15.7 g/dL Final             Discharge Instructions and Follow-Up:   Discharge diet: Regular   Discharge activity: No sex for 6 week(s)   Discharge follow-up: Follow up with [facility]  in 2 and 6 weeks   Wound care: Ice to area for comfort           Discharge Disposition:   Discharged to home      Attestation:  I have reviewed today's vital signs, notes, medications, labs and imaging.    Yani Tijerina MD   Electronically signed by   Yani Tijerina MD  [facility]

## 2022-10-09 NOTE — LACTATION NOTE
Lactation visit with MCKENNA Dorado, and baby Albert.    Infant has struggled with breastfeeding we tried a shield yesterday and that has worked intermittently. Infant is a tongue thruster and it takes her a bit to get her suck pattern organized and coordinated.      Infant has a jaundice level requiring phototherapy so additional supplementation necessary.

## 2022-10-09 NOTE — LACTATION NOTE
Lactation visit with Ave, MCKENNA, and baby girl.    Infant has struggled with breastfeeding. We introduced a nipple shield, which has helped intermittently but infant is also thrusting her tongue a lot so breastfeeding has continued to be a challenge. Infant is HR jaundice requiring phototherapy, so additional supplementation is recommended by Peds, minimum of 20 ml per feeding.    FARRAH assisted with 0930 feeding to teach bottling technique. We practiced side-lying, paced bottle feeding with a slow flow nipple. It took infant a couple of minutes to coordinate her suck, showing parents how to tip the the nipple to the roof of the mouth to stimulate infant to begin sucking. Infant did begin coordinated sucking pattern and then LC demonstrated paced bottle feeding. Also showed side-lying position. Infant tolerates pacing/positioning well.    LC allows FOB to practice as well. FOB does well with techniques and infant finishes 20ml via bottle.    Discussed continuing to use these techniques until infant begins pacing herself and to use a slow flow nipple. Slow flow nipple will continue to allow infant to transition between breast feeding bottle feeding.    Will consult OT if they are able to recommend bottle nipple. Otherwise LC feels like infant demonstrated enough skill to use any nipple share. Other than bottle feeding, LC showed parents Haakaa SNS as well.    Discussed pumping and expected volumes with pumping. Recommended lactation follow up with [facility].    Feeding plan recommendations: provide unlimited, on-demand breast feedings: At least 8-12 times/24 hours (reviewed early feeding cues). Suggested pumping if baby has a poor feeding or if supplementation is necessary. Encouraged on-going use of a feeding log or anyi to record feedings along with void/stool patterns.  Follow up with Pediatrician as requested and encouraged lactation follow up. Reviewed [place] outpatient lactation resources.  Appreciative of visit.    No Londono RN, IBCLC

## 2022-10-09 NOTE — PLAN OF CARE
Vital signs stable. Postpartum assessment WDL. Pain controlled with tylenol, ibuprofen, cold packs. Patient voiding without difficulty. Breastfeeding on cue with minimal assist, although baby is still working on feedings and has been supplemented via FF w/syringe and bottle.  Lactation able to spend time with pt this am and give support.  Plan in place and pt will be following up with baby at [facility], which provides out pt lactation.  Baby also going home on phototherapy and pt and spouse were shown how to use equipment.  Patient and infant bonding well. Will continue with current plan of care.    D  : VSS, assessments WDL.   I: Pt. received complete discharge paperwork and home medications.  Pt. was given times of last dose for all discharge medications in writing on discharge medication sheets.  Discharge teaching included home medication, pain management, activity restrictions, postpartum cares, and signs and symptoms of infection.    A: Discharge outcomes on care plan met.  Mother states understanding and comfort with self and baby cares.  P: Pt. discharged to home.  Pt. was discharged with baby, and bands were checked at time of discharge.  Pt. was accompanied by , nurse and baby, and left with personal belongings.  Home care ordered.  Pt. to follow up with OB per MD order.  Pt. had no further questions at the time of discharge and no unmet needs were identified.   OB

## 2023-05-07 ENCOUNTER — HEALTH MAINTENANCE LETTER (OUTPATIENT)
Age: 33
End: 2023-05-07

## 2023-05-26 NOTE — ED TRIAGE NOTES
facial dog bite by S.O.'s family dog. shots up to date per pt  
Yes - the patient is able to be screened

## 2023-10-10 ENCOUNTER — TRANSFERRED RECORDS (OUTPATIENT)
Dept: MULTI SPECIALTY CLINIC | Facility: CLINIC | Age: 33
End: 2023-10-10

## 2023-10-10 LAB — PAP SMEAR - HIM PATIENT REPORTED: NORMAL

## 2024-03-07 ENCOUNTER — OFFICE VISIT (OUTPATIENT)
Dept: PEDIATRICS | Facility: CLINIC | Age: 34
End: 2024-03-07
Payer: COMMERCIAL

## 2024-03-07 ENCOUNTER — OFFICE VISIT (OUTPATIENT)
Dept: URGENT CARE | Facility: URGENT CARE | Age: 34
End: 2024-03-07
Payer: COMMERCIAL

## 2024-03-07 VITALS
OXYGEN SATURATION: 95 % | RESPIRATION RATE: 18 BRPM | BODY MASS INDEX: 35.28 KG/M2 | DIASTOLIC BLOOD PRESSURE: 81 MMHG | WEIGHT: 212 LBS | SYSTOLIC BLOOD PRESSURE: 113 MMHG | HEART RATE: 90 BPM | TEMPERATURE: 97.8 F

## 2024-03-07 VITALS
HEIGHT: 65 IN | WEIGHT: 212 LBS | BODY MASS INDEX: 35.32 KG/M2 | SYSTOLIC BLOOD PRESSURE: 104 MMHG | HEART RATE: 95 BPM | OXYGEN SATURATION: 98 % | TEMPERATURE: 97.9 F | DIASTOLIC BLOOD PRESSURE: 74 MMHG

## 2024-03-07 DIAGNOSIS — A08.4 VIRAL GASTROENTERITIS: Primary | ICD-10-CM

## 2024-03-07 DIAGNOSIS — K52.9 GASTROENTERITIS: Primary | ICD-10-CM

## 2024-03-07 PROCEDURE — 99204 OFFICE O/P NEW MOD 45 MIN: CPT | Mod: 25 | Performed by: FAMILY MEDICINE

## 2024-03-07 PROCEDURE — 99207 REFERRAL TO ACUTE AND DIAGNOSTIC SERVICES: CPT | Performed by: PHYSICIAN ASSISTANT

## 2024-03-07 PROCEDURE — 96360 HYDRATION IV INFUSION INIT: CPT | Performed by: FAMILY MEDICINE

## 2024-03-07 RX ORDER — LEVONORGESTREL 13.5 MG/1
1 INTRAUTERINE DEVICE INTRAUTERINE ONCE
COMMUNITY
End: 2024-09-24

## 2024-03-07 RX ORDER — ONDANSETRON 4 MG/1
4 TABLET, ORALLY DISINTEGRATING ORAL EVERY 8 HOURS PRN
Qty: 15 TABLET | Refills: 0 | Status: SHIPPED | OUTPATIENT
Start: 2024-03-07 | End: 2024-09-24

## 2024-03-07 RX ADMIN — Medication 1000 ML: at 16:11

## 2024-03-07 ASSESSMENT — ENCOUNTER SYMPTOMS
SORE THROAT: 1
DIARRHEA: 1
FEVER: 0
DIAPHORESIS: 1
RHINORRHEA: 0
CHILLS: 1
VOMITING: 1
COUGH: 0

## 2024-03-07 NOTE — PROGRESS NOTES
{PROVIDER CHARTING PREFERENCE:344453}    Subjective   Ave is a 33 year old, presenting for the following health issues:  Diarrhea (X 4 days, dehydration)  {(!) Visit Details have not yet been documented.  Please enter Visit Details and then use this list to pull in documentation. (Optional):142229}  HPI     Diarrhea  Onset/Duration: X 4 days  Description:       Consistency of stool: watery       Blood in stool: No       Number of loose stools past 24 hours: 8  Progression of Symptoms: same  Accompanying signs and symptoms:       Fever: No       Nausea/Vomiting: YES- both       Abdominal pain: YES- lower abdominal cramping noted       Weight loss: No       Episodes of constipation: No  History   Ill contacts: No  Recent use of antibiotics: No  Recent travels: No  Recent medication-new or changes(Rx or OTC): No  Precipitating or alleviating factors: no known cause, relaxing is an alleviating factor  Therapies tried and outcome: Imodium, oral fluids.  Imodium helped some, notes second dose she took, she vomited, suspected due to taste.      {ROS Picklists (Optional):808571}      Objective    Wt 96.2 kg (212 lb)   LMP  (LMP Unknown)   BMI 35.28 kg/m    Body mass index is 35.28 kg/m .  Physical Exam   {Exam List (Optional):561420}    {Diagnostic Test Results (Optional):519145}        Signed Electronically by: Ruben Sweeney MD  {Email feedback regarding this note to [email or web address]   :[id number]}

## 2024-03-07 NOTE — PROGRESS NOTES
Assessment & Plan:        ICD-10-CM    1. Viral gastroenteritis  A08.4 Enteric Bacteria and Virus Panel by [facility] Stool     C. difficile Toxin B PCR with reflex to C. difficile Antigen and Toxins A/B EIA     ondansetron (ZOFRAN ODT) 4 MG ODT tab            Plan/Clinical Decision Making:    Patient with severe diarrhea with mild nausea and vomiting for 4 days with concerns of dehydration. Last urinated this morning. Zofran prescribed.  Benign abdominal exam.   She would like to have IV fluids. I called [facility] and spoke with DR. Sweeney. Patient will go directly to [facility]. I did order stool testing. 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   Discussed symptoms usually due to viral illness and stool testing can wait until symptoms over 7 days since treatment usually wouldn't change.   I prescribed Zofran for nausea. Can try Pepcid for epigastric pain. Rest, clear fluids.         Return if symptoms worsen or fail to improve, for in 5-7 days.     At the end of the encounter, I discussed results, diagnosis, medications. Discussed red flags for immediate return to clinic/ER, as well as indications for follow up if no improvement. Patient understood and agreed to plan. Patient was stable for discharge.        Gemma Canales PA-C on 3/7/2024 at 3:06 PM          Subjective:     HPI:    Ave is a 33 year old female who presents to clinic today for the following health issues:  Chief Complaint   Patient presents with    Urgent Care     Nausea and diarrhea x 4d.     HPI    Patient complains of bad diarrhea for 4 days. Feeling weaker and  Was taking imodium which was helping.   Has had some vomiting. Vomits once in morning.   Concerns of dehydration. Able to drink small sips of fluids. Last urinated this morning.   Having body aches.   No muscle cramping.   No known exposure to anything.   Patient having about 15 bouts a day of diarrhea.     No LMP recorded. (Menstrual status: IUD).  Low chance of pregnancy,  "has IUD.     PCP: [facility]     Review of Systems   Constitutional:  Positive for chills and diaphoresis. Negative for fever.   HENT:  Positive for sore throat. Negative for congestion and rhinorrhea.    Respiratory:  Negative for cough.    Gastrointestinal:  Positive for diarrhea and vomiting.         Patient Active Problem List   Diagnosis    Recurrent major depressive disorder, in partial remission (H24)    Post-term pregnancy, 40-42 weeks of gestation    Allergic rhinitis    Normal vaginal delivery    Abnormal fetal heart rate, delivered    Short umbilical cord        Past Medical History:   Diagnosis Date    Depression        Social History     Tobacco Use    Smoking status: Former     Types: Cigarettes    Smokeless tobacco: Never   Substance Use Topics    Alcohol use: Yes     Comment: Once a day           Objective:     Vitals:    03/07/24 1427   BP: 104/74   Pulse: 95   Temp: 97.9  F (36.6  C)   TempSrc: Tympanic   SpO2: 98%   Weight: 96.2 kg (212 lb)   Height: 1.651 m (5' 5\")         Physical Exam   EXAM:   Pleasant, alert, appropriate appearance. NAD.  Head Exam: Normocephalic, atraumatic.  Eye Exam:  non icteric/injection.    OroPharynx Exam:  Moist mucous membranes. No erythema, pharynx without exudate or hypertrophy.  Neck/Thyroid Exam:  No LAD.  Chest/Respiratory Exam: CTAB.  Cardiovascular Exam: RRR. No murmur or rubs.  ABD: soft,mild epigastric tenderness, normal bowel sounds, no rebound/guarding.  No masses/organomegaly.  Ext/musculoskeletal: Grossly intact, No edema  Neuro: CN II-XII intact grossly intact.  Skin: no rash or lesion.      Results:  No results found for any visits on 03/07/24.    "

## 2024-03-07 NOTE — PROGRESS NOTES
Assessment & Plan     Gastroenteritis  - sodium chloride (PF) 0.9% PF flush 3 mL  - sodium chloride 0.9% BOLUS 1,000 mL    1L normal saline provided in clinic     Time frame of symptoms consistent with gastroenteritis. Absent of bloody stools seen with EHEC/salmonella/shigella. I do not feel she warrants extensive stool testing as she is maintaining hydration well during this 4 day period of illness, unlikely to change plan. If stools continue to be voluminous/continuous with unusual smell and flatulence then submit sample for testing in a few days. Advise continuing loperamide to reduce frequency of loose stools      Ruben Sweeney MD   [place] [facility]    Uma Dorado is a 33 year old female who presents to clinic today for the following health issues:  Chief Complaint   Patient presents with    Diarrhea     X 4 days, dehydration     HPI    Patient is referred to us from urgent care [place] for evaluation of diarrhea with possible mild dehydration. 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 Had body aches up front for which she took tylenol. Took immodium yesterday every 6 hours, tried a double dose this morning and she gagged /vomited this, no further vomiting. No abdominal pain currently. No recent use of antibiotics.     Diarrhea  Onset/Duration: X 4 days  Description:       Consistency of stool: watery       Blood in stool: No       Number of loose stools past 24 hours: 8  Progression of Symptoms: same  Accompanying signs and symptoms:       Fever: No       Nausea/Vomiting: YES- both       Abdominal pain: YES- lower abdominal cramping noted       Weight loss: No       Episodes of constipation: No  History   Ill contacts: No  Recent use of antibiotics: No  Recent travels: No  Recent medication-new or changes(Rx or OTC): No  Precipitating or alleviating factors: no known cause, relaxing is an alleviating factor  Therapies tried and outcome: Imodium, oral fluids.  Imodium helped some, notes  second dose she took, she vomited, suspected due to taste.      Patient Active Problem List    Diagnosis Date Noted    Normal vaginal delivery 10/07/2022     Priority: Medium    Abnormal fetal heart rate, delivered 10/07/2022     Priority: Medium     Likely due to short cord      Short umbilical cord 10/07/2022     Priority: Medium    Post-term pregnancy, 40-42 weeks of gestation 10/06/2022     Priority: Medium    Recurrent major depressive disorder, in partial remission (H24) 01/14/2019     Priority: Medium    Allergic rhinitis 04/01/2015     Priority: Medium       Current Outpatient Medications   Medication    acetaminophen (TYLENOL) 325 MG tablet    citalopram (CELEXA) 20 MG tablet    docusate sodium (COLACE) 100 MG capsule    ferrous sulfate (FEROSUL) 325 (65 Fe) MG tablet    ibuprofen (ADVIL/MOTRIN) 200 MG tablet    levonorgestrel (PRISCILA) 13.5 MG IUD    ondansetron (ZOFRAN ODT) 4 MG ODT tab    Prenatal Vit-Fe Fumarate-FA (PRENATAL MULTIVITAMIN W/IRON) 27-0.8 MG tablet     Current Facility-Administered Medications   Medication    sodium chloride (PF) 0.9% PF flush 3 mL           Objective    /81 (BP Location: Left arm, Patient Position: Chair, Cuff Size: Adult Large)   Pulse 90   Temp 97.8  F (36.6  C) (Oral)   Resp 18   Wt 96.2 kg (212 lb)   LMP  (LMP Unknown)   SpO2 95%   BMI 35.28 kg/m    Physical Exam       GEN: normal mentation  CV: HDS  Pulm: non-labored  Gait: normal  Eyes/skin: no jaundice  No results found for any visits on 03/07/24.                  The use of Dragon/[employer] dictation services may have been used to construct the content in this note; any grammatical or spelling errors are non-intentional. Please contact the author of this note directly if you are in need of any clarification.

## 2024-03-07 NOTE — PATIENT INSTRUCTIONS
Generic Immodium swallow pill ( do not let dissolve in mouth) every 6 hours as needed for loose stools      Tylenol 500mg every 4 hours as needed for mild discomfort      Drink 100 ounces of water/fluids a day to keep hydrated  -- half of these fluids take in the form of electrolyte solution

## 2024-07-14 ENCOUNTER — HEALTH MAINTENANCE LETTER (OUTPATIENT)
Age: 34
End: 2024-07-14

## 2024-09-23 ENCOUNTER — TELEPHONE (OUTPATIENT)
Dept: INTERNAL MEDICINE | Facility: CLINIC | Age: 34
End: 2024-09-23
Payer: COMMERCIAL

## 2024-09-23 ENCOUNTER — NURSE TRIAGE (OUTPATIENT)
Dept: INTERNAL MEDICINE | Facility: CLINIC | Age: 34
End: 2024-09-23
Payer: COMMERCIAL

## 2024-09-23 NOTE — TELEPHONE ENCOUNTER
"Pt has an appt scheduled on 9/24 for chest pain/discomfort. Called to triage.     Per appt note:  \"New chest discomfort, without SOB, palpitations, or change in vital signs. Intermittent for the past 2 weeks. Feels similar to a chest pressure, as if I have high blood pressure, however BP is normal.\"    Patient Contact    Attempt # 1    Was call answered?  No.  Left message on voicemail with information to call me back.    Upon call back, please triage pt.     Thank you,  Jacqueline Spring RN  " OK for order to discontinue Oxygen if no longer requiring. The COVID vaccine distribution is determined by the [facility] Martines at this time. Would encourage pt to pay attention to local media as to when he will eligible to receive. I believe it is simply based on age alone and no associated medical problems will play a role.   ES

## 2024-09-23 NOTE — TELEPHONE ENCOUNTER
Dr. Ceballos,     Do you feel comfortable seeing pt in clinic tomorrow for this as scheduled?  Or do you suggest she be seen more urgently?    Please advise.   Thank you,  Jacqueline Spring RN

## 2024-09-23 NOTE — TELEPHONE ENCOUNTER
"    [facility]    Future Appointments 9/23/2024 - 3/22/2025        Date Visit Type Length Department Provider     9/24/2024  4:00 PM [facility] OFFICE VISIT  30 min  INTERNAL MEDICINE Suzette Ceballos MD    Location Instructions:     [facility] is in the [facility] at [address] in [place]. This just east of the [address] exit off of [address]. Free parking is available; access the lot from [address] [place] [address] or [facility].                      S-(situation):   Pt returning call for triage.    B-(background):   Patient is an ED Nurse.    A-(assessment):   Patient is aware of Cardiac Symptoms. Patient denies.  No Pressure on Chest  No Radiating Pain  No racing heart.  No SOB  VS stable    Chest discomfort:   Intermittent feeling x 2 weeks      R-(recommendations):     Patient is scheduled for tomorrow. Please review if appropriate (as 2LT states \"see in office today\".    Patient instructed to call back if symptoms change or if new symptoms present.Advised ED if cardiac symptoms develop.  Patient verbalizes agreement with plan.    MCKENZIE Morel, BSN  [place], [place]          Reason for Disposition   Chest pain(s) lasting a few seconds persists > 3 days    Additional Information   Negative: Followed an injury to chest   Negative: SEVERE chest pain   Negative: Pain also in shoulder(s) or arm(s) or jaw   Negative: Difficulty breathing   Negative: Cocaine use within last 3 days   Negative: Major surgery in the past month   Negative: Hip or leg fracture (broken bone) in past month (or had cast on leg or ankle in past month)   Negative: Illness requiring prolonged bedrest in past month (e.g., immobilization, long hospital stay)   Negative: Long-distance travel in past month (e.g., car, bus, train, plane; with trip lasting 6 or more hours)   Negative: History of prior 'blood clot' in leg or lungs (i.e., deep vein thrombosis, " pulmonary embolism)   Negative: History of inherited increased risk of blood clots (e.g., Factor 5 Leiden, Anti-thrombin 3, Protein C or Protein S deficiency, Prothrombin mutation)   Negative: Cancer treatment in the past two months (or has cancer now)   Negative: Heart beating irregularly or very rapidly   Negative: Patient says chest pain feels exactly the same as previously diagnosed 'heartburn' and describes burning in chest and accompanying sour taste in mouth    Protocols used: Chest Pain-A-[place]

## 2024-09-24 ENCOUNTER — OFFICE VISIT (OUTPATIENT)
Dept: INTERNAL MEDICINE | Facility: CLINIC | Age: 34
End: 2024-09-24
Payer: COMMERCIAL

## 2024-09-24 ENCOUNTER — ANCILLARY PROCEDURE (OUTPATIENT)
Dept: GENERAL RADIOLOGY | Facility: CLINIC | Age: 34
End: 2024-09-24
Attending: INTERNAL MEDICINE
Payer: COMMERCIAL

## 2024-09-24 VITALS
HEIGHT: 65 IN | HEART RATE: 63 BPM | RESPIRATION RATE: 16 BRPM | WEIGHT: 206.9 LBS | DIASTOLIC BLOOD PRESSURE: 68 MMHG | BODY MASS INDEX: 34.47 KG/M2 | OXYGEN SATURATION: 99 % | SYSTOLIC BLOOD PRESSURE: 118 MMHG

## 2024-09-24 DIAGNOSIS — R07.89 ATYPICAL CHEST PAIN: Primary | ICD-10-CM

## 2024-09-24 DIAGNOSIS — K21.9 GASTROESOPHAGEAL REFLUX DISEASE WITHOUT ESOPHAGITIS: ICD-10-CM

## 2024-09-24 DIAGNOSIS — R07.89 ATYPICAL CHEST PAIN: ICD-10-CM

## 2024-09-24 PROBLEM — O48.0 POST-TERM PREGNANCY, 40-42 WEEKS OF GESTATION: Status: RESOLVED | Noted: 2022-10-06 | Resolved: 2024-09-24

## 2024-09-24 LAB
ALBUMIN SERPL BCG-MCNC: 4.6 G/DL (ref 3.5–5.2)
ALP SERPL-CCNC: 67 U/L (ref 40–150)
ALT SERPL W P-5'-P-CCNC: 32 U/L (ref 0–50)
ANION GAP SERPL CALCULATED.3IONS-SCNC: 9 MMOL/L (ref 7–15)
AST SERPL W P-5'-P-CCNC: 28 U/L (ref 0–45)
BILIRUB SERPL-MCNC: 2.1 MG/DL
BUN SERPL-MCNC: 14.6 MG/DL (ref 6–20)
CALCIUM SERPL-MCNC: 9.9 MG/DL (ref 8.8–10.4)
CHLORIDE SERPL-SCNC: 105 MMOL/L (ref 98–107)
CREAT SERPL-MCNC: 0.82 MG/DL (ref 0.51–0.95)
EGFRCR SERPLBLD CKD-EPI 2021: >90 ML/MIN/1.73M2
ERYTHROCYTE [DISTWIDTH] IN BLOOD BY AUTOMATED COUNT: 13.5 % (ref 10–15)
GLUCOSE SERPL-MCNC: 103 MG/DL (ref 70–99)
HCO3 SERPL-SCNC: 24 MMOL/L (ref 22–29)
HCT VFR BLD AUTO: 33.6 % (ref 35–47)
HGB BLD-MCNC: 11.8 G/DL (ref 11.7–15.7)
MCH RBC QN AUTO: 31.8 PG (ref 26.5–33)
MCHC RBC AUTO-ENTMCNC: 35.1 G/DL (ref 31.5–36.5)
MCV RBC AUTO: 91 FL (ref 78–100)
PLATELET # BLD AUTO: 241 10E3/UL (ref 150–450)
POTASSIUM SERPL-SCNC: 3.8 MMOL/L (ref 3.4–5.3)
PROT SERPL-MCNC: 6.9 G/DL (ref 6.4–8.3)
RBC # BLD AUTO: 3.71 10E6/UL (ref 3.8–5.2)
SODIUM SERPL-SCNC: 138 MMOL/L (ref 135–145)
TSH SERPL DL<=0.005 MIU/L-ACNC: 1.94 UIU/ML (ref 0.3–4.2)
WBC # BLD AUTO: 8.2 10E3/UL (ref 4–11)

## 2024-09-24 PROCEDURE — 71046 X-RAY EXAM CHEST 2 VIEWS: CPT | Mod: TC | Performed by: RADIOLOGY

## 2024-09-24 PROCEDURE — 36415 COLL VENOUS BLD VENIPUNCTURE: CPT | Performed by: INTERNAL MEDICINE

## 2024-09-24 PROCEDURE — 80053 COMPREHEN METABOLIC PANEL: CPT | Performed by: INTERNAL MEDICINE

## 2024-09-24 PROCEDURE — 99214 OFFICE O/P EST MOD 30 MIN: CPT | Performed by: INTERNAL MEDICINE

## 2024-09-24 PROCEDURE — 84443 ASSAY THYROID STIM HORMONE: CPT | Performed by: INTERNAL MEDICINE

## 2024-09-24 PROCEDURE — 85027 COMPLETE CBC AUTOMATED: CPT | Performed by: INTERNAL MEDICINE

## 2024-09-24 ASSESSMENT — PATIENT HEALTH QUESTIONNAIRE - PHQ9
SUM OF ALL RESPONSES TO PHQ QUESTIONS 1-9: 0
10. IF YOU CHECKED OFF ANY PROBLEMS, HOW DIFFICULT HAVE THESE PROBLEMS MADE IT FOR YOU TO DO YOUR WORK, TAKE CARE OF THINGS AT HOME, OR GET ALONG WITH OTHER PEOPLE: NOT DIFFICULT AT ALL
SUM OF ALL RESPONSES TO PHQ QUESTIONS 1-9: 0

## 2024-09-24 NOTE — TELEPHONE ENCOUNTER
Patient Contact    Attempt # 1    Was call answered?  No.  Left message on voicemail with information to call me back.    Upon callback, please relay provider message from note below.

## 2024-09-24 NOTE — PROGRESS NOTES
"  Assessment & Plan     Atypical chest pain  Obtain Chest Xray   Labs ordered    Gastroesophageal reflux disease without esophagitis  Check H,Pylori antigen  Advised OTC Pepcid or Omeprazole.        Return if no better.      BMI  Estimated body mass index is 34.43 kg/m  as calculated from the following:    Height as of this encounter: 1.651 m (5' 5\").    Weight as of this encounter: 93.8 kg (206 lb 14.4 oz).             Uma Dorado is a 34 year old, presenting for the following health issues:  Chest Pain    History of Present Illness       Reason for visit:  Intermitent chest discomfort  Symptom onset:  1-2 weeks ago She is missing 1 dose(s) of medications per week.          Initially was having episodes every  2 days but now is having episodes 2 times a day.  Episodes last 1 to 3 hours. She feels a sensation of tightness in her upper chest till lower neck on both sides.  She does not have any triggers.  Denies any anxiety.  Symptom intensity is 4/10.    No prior similar symptoms.  Denies any shortness of breath or cough.    She does have a H/O GERD for the last 3 years.  Symptoms were worse during her pregnancy .  Not currently taking any medication.              Review of Systems  Constitutional, neuro, ENT, endocrine, pulmonary, cardiac, gastrointestinal, genitourinary, musculoskeletal, integument and psychiatric systems are negative, except as otherwise noted.      Objective    /68   Pulse 63   Resp 16   Ht 1.651 m (5' 5\")   Wt 93.8 kg (206 lb 14.4 oz)   SpO2 99%   BMI 34.43 kg/m    Body mass index is 34.43 kg/m .  Physical Exam   GENERAL: alert and no distress  NECK: no adenopathy, no asymmetry, masses, or scars  RESP: lungs clear to auscultation - no rales, rhonchi or wheezes  CV: regular rate and rhythm, normal S1 S2, no S3 or S4, no murmur, click or rub, no peripheral edema  ABDOMEN: soft, nontender, no hepatosplenomegaly, no masses and bowel sounds normal  MS: no gross musculoskeletal " defects noted, no edema            Signed Electronically by: Suzette Ceballos MD

## 2025-07-19 ENCOUNTER — HEALTH MAINTENANCE LETTER (OUTPATIENT)
Age: 35
End: 2025-07-19

## 2025-07-21 ENCOUNTER — TELEPHONE (OUTPATIENT)
Dept: OBGYN | Facility: CLINIC | Age: 35
End: 2025-07-21

## 2025-07-21 ENCOUNTER — VIRTUAL VISIT (OUTPATIENT)
Dept: OBGYN | Facility: CLINIC | Age: 35
End: 2025-07-21
Payer: COMMERCIAL

## 2025-07-21 DIAGNOSIS — O21.9 VOMITING DURING PREGNANCY: Primary | ICD-10-CM

## 2025-07-21 DIAGNOSIS — Z34.90 SUPERVISION OF NORMAL PREGNANCY: Primary | ICD-10-CM

## 2025-07-21 LAB
ABO + RH BLD: NORMAL
BLD GP AB SCN SERPL QL: NEGATIVE
SPECIMEN EXP DATE BLD: NORMAL

## 2025-07-21 RX ORDER — PRENATAL VIT NO.130/IRON/FOLIC 27MG-0.8MG
1 TABLET ORAL DAILY
COMMUNITY

## 2025-07-21 RX ORDER — PYRIDOXINE HCL (VITAMIN B6) 25 MG
25 TABLET ORAL DAILY
COMMUNITY

## 2025-07-21 RX ORDER — CITALOPRAM HYDROBROMIDE 40 MG/1
1 TABLET ORAL DAILY
COMMUNITY
Start: 2025-06-25

## 2025-07-21 RX ORDER — VITAMIN A ACETATE, BETA CAROTENE, ASCORBIC ACID, CHOLECALCIFEROL, .ALPHA.-TOCOPHEROL ACETATE, DL-, THIAMINE MONONITRATE, RIBOFLAVIN, NIACINAMIDE, PYRIDOXINE HYDROCHLORIDE, FOLIC ACID, CYANOCOBALAMIN, CALCIUM CARBONATE, FERROUS FUMARATE, ZINC OXIDE, CUPRIC OXIDE 3080; 12; 120; 400; 1; 1.84; 3; 20; 22; 920; 25; 200; 27; 10; 2 [IU]/1; UG/1; MG/1; [IU]/1; MG/1; MG/1; MG/1; MG/1; MG/1; [IU]/1; MG/1; MG/1; MG/1; MG/1; MG/1
1 TABLET, FILM COATED ORAL DAILY
COMMUNITY
End: 2025-07-21

## 2025-07-21 RX ORDER — HYDROCORTISONE 1 %
1 OINTMENT (GRAM) TOPICAL DAILY
COMMUNITY

## 2025-07-21 ASSESSMENT — PATIENT HEALTH QUESTIONNAIRE - PHQ9: SUM OF ALL RESPONSES TO PHQ QUESTIONS 1-9: 3

## 2025-07-21 NOTE — TELEPHONE ENCOUNTER
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 Pt denies any sxs of TB but wondering if she needs any blood work done with pregnancy?     Also experiencing increased nausea and vomiting. Making it difficult to work. Last pregnancy she used Zofran which worked very well. She has tired vit B6/unisom and does not tolerate unisom well. Hoping to get a rx for Zofran.     Ok to send in?     Please advise    MCKENZIE Guan

## 2025-07-21 NOTE — PROGRESS NOTES
NPN nurse visit done over the phone. Pt will be given NPN folder and book at her upcoming appt.  Discussed optional screening available to assess chromosomal anomalies. Questions answered. Informed pt of the clinic structure (on-call does deliveries for the day, may be male or female doctor). Pt advised to call the clinic if she has any questions or concerns related to her pregnancy. Prenatal labs will be obtained at her upcoming appt. New prenatal visit scheduled on 25 with Dr Ku.      8w3d    Menstrual cycles: regular  Date of positive pregnancy test:   Medications stopped upon pos HPT: ashwagandha supplement    Last pap: - normal per pt        Patient supplied answers from flow sheet for:  Prenatal OB Questionnaire.  Past Medical History  Have you ever recieved care for your mental health? : (!) Yes (Therapist for 14 years, on celexa has psychatrist)  Have you ever been in a major accident or suffered serious trauma?: No  Within the last year, has anyone hit, slapped, kicked or otherwise hurt you?: No  In the last year, has anyone forced you to have sex when you didn't want to?: No    Past Medical History 2   Have you ever received a blood transfusion?: No  Would you accept a blood transfusion if was medically recommended?: Yes  Does anyone in your home smoke?: No   Is your blood type Rh negative?: No  Have you ever ?: No  Have you been hospitalized for a nonsurgical reason excluding normal delivery?: No  Have you ever had an abnormal pap smear?: (!) Yes (10-12 years ago)    Past Medical History (Continued)  Do you have a history of abnormalities of the uterus?: No  Did your mother take LORETTA or any other hormones when she was pregnant with you?: No  Do you have any other problems we have not asked about which you feel may be important to this pregnancy?: No                   MCKENZIE Guan

## 2025-07-22 ENCOUNTER — ANCILLARY PROCEDURE (OUTPATIENT)
Dept: ULTRASOUND IMAGING | Facility: CLINIC | Age: 35
End: 2025-07-22
Payer: COMMERCIAL

## 2025-07-22 ENCOUNTER — LAB (OUTPATIENT)
Dept: LAB | Facility: CLINIC | Age: 35
End: 2025-07-22
Payer: COMMERCIAL

## 2025-07-22 DIAGNOSIS — Z34.90 SUPERVISION OF NORMAL PREGNANCY: ICD-10-CM

## 2025-07-22 LAB
ERYTHROCYTE [DISTWIDTH] IN BLOOD BY AUTOMATED COUNT: 14 % (ref 10–15)
EST. AVERAGE GLUCOSE BLD GHB EST-MCNC: 71 MG/DL
HBA1C MFR BLD: 4.1 % (ref 0–5.6)
HCT VFR BLD AUTO: 29.7 % (ref 35–47)
HGB BLD-MCNC: 10.8 G/DL (ref 11.7–15.7)
MCH RBC QN AUTO: 31.9 PG (ref 26.5–33)
MCHC RBC AUTO-ENTMCNC: 36.4 G/DL (ref 31.5–36.5)
MCV RBC AUTO: 88 FL (ref 78–100)
PLATELET # BLD AUTO: 206 10E3/UL (ref 150–450)
RBC # BLD AUTO: 3.39 10E6/UL (ref 3.8–5.2)
RUBV IGG SERPL QL IA: 3.4 INDEX
RUBV IGG SERPL QL IA: POSITIVE
T PALLIDUM AB SER QL: NONREACTIVE
WBC # BLD AUTO: 9.1 10E3/UL (ref 4–11)

## 2025-07-22 PROCEDURE — 87340 HEPATITIS B SURFACE AG IA: CPT

## 2025-07-22 PROCEDURE — 86803 HEPATITIS C AB TEST: CPT

## 2025-07-22 PROCEDURE — 85027 COMPLETE CBC AUTOMATED: CPT

## 2025-07-22 PROCEDURE — 76801 OB US < 14 WKS SINGLE FETUS: CPT | Performed by: OBSTETRICS & GYNECOLOGY

## 2025-07-22 PROCEDURE — 3044F HG A1C LEVEL LT 7.0%: CPT

## 2025-07-22 PROCEDURE — 87086 URINE CULTURE/COLONY COUNT: CPT

## 2025-07-22 PROCEDURE — 83036 HEMOGLOBIN GLYCOSYLATED A1C: CPT

## 2025-07-22 PROCEDURE — 36415 COLL VENOUS BLD VENIPUNCTURE: CPT

## 2025-07-22 PROCEDURE — 86850 RBC ANTIBODY SCREEN: CPT

## 2025-07-22 PROCEDURE — 87389 HIV-1 AG W/HIV-1&-2 AB AG IA: CPT

## 2025-07-22 PROCEDURE — 86900 BLOOD TYPING SEROLOGIC ABO: CPT

## 2025-07-22 PROCEDURE — 86762 RUBELLA ANTIBODY: CPT

## 2025-07-22 PROCEDURE — 86901 BLOOD TYPING SEROLOGIC RH(D): CPT

## 2025-07-22 PROCEDURE — 86780 TREPONEMA PALLIDUM: CPT

## 2025-07-23 LAB
BACTERIA UR CULT: NO GROWTH
HBV SURFACE AG SERPL QL IA: NONREACTIVE
HCV AB SERPL QL IA: NONREACTIVE
HIV 1+2 AB+HIV1 P24 AG SERPL QL IA: NONREACTIVE

## 2025-07-23 RX ORDER — ONDANSETRON 4 MG/1
4 TABLET, ORALLY DISINTEGRATING ORAL EVERY 8 HOURS PRN
Qty: 30 TABLET | Refills: 1 | Status: SHIPPED | OUTPATIENT
Start: 2025-07-23

## 2025-07-23 NOTE — TELEPHONE ENCOUNTER
Julian Douglas MD to Me (Selected Message)        7/23/25 10:17 AM  OK to send in script for Zofran 4mg Q8 hrs prn #30

## 2025-07-23 NOTE — TELEPHONE ENCOUNTER
Pt calling to check on the status of the message.     Routed to the on-call provider as I am not sure that Dr. Ku is working on her in basket at this time.    8w5d    Belle LAMBERT RN  [facility]

## 2025-07-23 NOTE — TELEPHONE ENCOUNTER
Spoke with pt and advised that rx was sent in.     Advised to continue to use vit b6 and unisom.    Belle LAMBERT RN  [facility]

## 2025-08-04 ENCOUNTER — RESULTS FOLLOW-UP (OUTPATIENT)
Dept: OBGYN | Facility: CLINIC | Age: 35
End: 2025-08-04
Payer: COMMERCIAL

## 2025-09-02 ENCOUNTER — PRENATAL OFFICE VISIT (OUTPATIENT)
Dept: OBGYN | Facility: CLINIC | Age: 35
End: 2025-09-02
Payer: COMMERCIAL

## 2025-09-02 ENCOUNTER — TRANSCRIBE ORDERS (OUTPATIENT)
Dept: MATERNAL FETAL MEDICINE | Facility: CLINIC | Age: 35
End: 2025-09-02

## 2025-09-02 VITALS
BODY MASS INDEX: 32.32 KG/M2 | DIASTOLIC BLOOD PRESSURE: 58 MMHG | HEIGHT: 65 IN | WEIGHT: 194 LBS | SYSTOLIC BLOOD PRESSURE: 90 MMHG

## 2025-09-02 DIAGNOSIS — Z34.80 PRENATAL CARE OF MULTIGRAVIDA, ANTEPARTUM: Primary | ICD-10-CM

## 2025-09-02 DIAGNOSIS — O26.90 PREGNANCY RELATED CONDITION, ANTEPARTUM: Primary | ICD-10-CM

## 2025-09-02 PROCEDURE — 0502F SUBSEQUENT PRENATAL CARE: CPT | Performed by: OBSTETRICS & GYNECOLOGY

## 2025-09-02 PROCEDURE — 3074F SYST BP LT 130 MM HG: CPT | Performed by: OBSTETRICS & GYNECOLOGY

## 2025-09-02 PROCEDURE — 99207 PR PRENATAL VISIT: CPT | Performed by: OBSTETRICS & GYNECOLOGY

## 2025-09-02 PROCEDURE — 36415 COLL VENOUS BLD VENIPUNCTURE: CPT | Performed by: OBSTETRICS & GYNECOLOGY

## 2025-09-02 PROCEDURE — 3078F DIAST BP <80 MM HG: CPT | Performed by: OBSTETRICS & GYNECOLOGY

## 2025-09-02 RX ORDER — ONDANSETRON 4 MG/1
4 TABLET, ORALLY DISINTEGRATING ORAL EVERY 8 HOURS PRN
Qty: 16 TABLET | Refills: 2 | Status: SHIPPED | OUTPATIENT
Start: 2025-09-02